# Patient Record
Sex: MALE | Race: BLACK OR AFRICAN AMERICAN | Employment: OTHER | URBAN - NONMETROPOLITAN AREA
[De-identification: names, ages, dates, MRNs, and addresses within clinical notes are randomized per-mention and may not be internally consistent; named-entity substitution may affect disease eponyms.]

---

## 2018-10-02 ENCOUNTER — HOSPITAL ENCOUNTER (EMERGENCY)
Age: 63
Discharge: HOME | End: 2018-10-02
Payer: MEDICAID

## 2018-10-02 VITALS
TEMPERATURE: 98.6 F | SYSTOLIC BLOOD PRESSURE: 133 MMHG | BODY MASS INDEX: 26.31 KG/M2 | OXYGEN SATURATION: 94 % | HEART RATE: 93 BPM | HEIGHT: 74 IN | DIASTOLIC BLOOD PRESSURE: 99 MMHG | RESPIRATION RATE: 18 BRPM | WEIGHT: 205 LBS

## 2018-10-02 DIAGNOSIS — R03.0 ELEVATED BLOOD PRESSURE READING: ICD-10-CM

## 2018-10-02 DIAGNOSIS — E86.0 DEHYDRATION: ICD-10-CM

## 2018-10-02 DIAGNOSIS — R04.0 ANTERIOR EPISTAXIS: Primary | ICD-10-CM

## 2018-10-02 PROCEDURE — 96360 HYDRATION IV INFUSION INIT: CPT

## 2018-10-02 PROCEDURE — 2500000003 HC RX 250 WO HCPCS: Performed by: PHYSICIAN ASSISTANT

## 2018-10-02 PROCEDURE — 99283 EMERGENCY DEPT VISIT LOW MDM: CPT

## 2018-10-02 PROCEDURE — 2580000003 HC RX 258: Performed by: PHYSICIAN ASSISTANT

## 2018-10-02 RX ORDER — 0.9 % SODIUM CHLORIDE 0.9 %
1000 INTRAVENOUS SOLUTION INTRAVENOUS ONCE
Status: COMPLETED | OUTPATIENT
Start: 2018-10-02 | End: 2018-10-02

## 2018-10-02 RX ADMIN — SODIUM CHLORIDE 1000 ML: 9 INJECTION, SOLUTION INTRAVENOUS at 14:20

## 2018-10-02 RX ADMIN — PHENYLEPHRINE HYDROCHLORIDE 1 SPRAY: 0.5 SPRAY NASAL at 14:02

## 2018-10-02 ASSESSMENT — ENCOUNTER SYMPTOMS
COUGH: 0
RHINORRHEA: 0
BACK PAIN: 0
WHEEZING: 0
ABDOMINAL PAIN: 0
NAUSEA: 0
SORE THROAT: 0
EYE DISCHARGE: 0
SHORTNESS OF BREATH: 0
VOMITING: 0
EYE REDNESS: 0
DIARRHEA: 0

## 2018-10-02 NOTE — ED TRIAGE NOTES
Arrives to  ER for the evaluation of left nares nose bleed that started approx 1 hour ago. Has been having nose bleed the past 3 days which he has been able to stop. Today the bleeding would not stop. Denies any use of anticoagulant or ASA, plavix. Only takes diabetic medications. [place], [id number] in room to assess.

## 2020-03-16 ENCOUNTER — HOSPITAL ENCOUNTER (OUTPATIENT)
Age: 65
Setting detail: SPECIMEN
Discharge: HOME | End: 2020-03-16
Payer: MEDICAID

## 2020-03-16 LAB
ABSOLUTE EOS #: 0.13 K/UL (ref 0–0.44)
ABSOLUTE IMMATURE GRANULOCYTE: <0.03 K/UL (ref 0–0.3)
ABSOLUTE LYMPH #: 2.1 K/UL (ref 1.1–3.7)
ABSOLUTE MONO #: 0.56 K/UL (ref 0.1–1.2)
ALBUMIN SERPL-MCNC: 4.4 G/DL (ref 3.5–5.2)
ALBUMIN/GLOBULIN RATIO: 1.3 (ref 1–2.5)
ALP BLD-CCNC: 69 U/L (ref 40–129)
ALT SERPL-CCNC: 21 U/L (ref 5–41)
ANION GAP SERPL CALCULATED.3IONS-SCNC: 13 MMOL/L (ref 9–17)
AST SERPL-CCNC: 15 U/L
BASOPHILS # BLD: 1 % (ref 0–2)
BASOPHILS ABSOLUTE: 0.06 K/UL (ref 0–0.2)
BILIRUB SERPL-MCNC: 0.94 MG/DL (ref 0.3–1.2)
BUN BLDV-MCNC: 12 MG/DL (ref 8–23)
BUN/CREAT BLD: NORMAL (ref 9–20)
CALCIUM SERPL-MCNC: 9.7 MG/DL (ref 8.6–10.4)
CHLORIDE BLD-SCNC: 101 MMOL/L (ref 98–107)
CHOLESTEROL/HDL RATIO: 4.2
CHOLESTEROL: 162 MG/DL
CO2: 24 MMOL/L (ref 20–31)
CREAT SERPL-MCNC: 0.86 MG/DL (ref 0.7–1.2)
DIFFERENTIAL TYPE: ABNORMAL
EOSINOPHILS RELATIVE PERCENT: 2 % (ref 1–4)
GFR AFRICAN AMERICAN: >60 ML/MIN
GFR NON-AFRICAN AMERICAN: >60 ML/MIN
GFR SERPL CREATININE-BSD FRML MDRD: NORMAL ML/MIN/{1.73_M2}
GFR SERPL CREATININE-BSD FRML MDRD: NORMAL ML/MIN/{1.73_M2}
GLUCOSE BLD-MCNC: 91 MG/DL (ref 70–99)
HCT VFR BLD CALC: 51.3 % (ref 40.7–50.3)
HDLC SERPL-MCNC: 39 MG/DL
HEMOGLOBIN: 16.6 G/DL (ref 13–17)
IMMATURE GRANULOCYTES: 0 %
LDL CHOLESTEROL: 97 MG/DL (ref 0–130)
LYMPHOCYTES # BLD: 27 % (ref 24–43)
MCH RBC QN AUTO: 29.8 PG (ref 25.2–33.5)
MCHC RBC AUTO-ENTMCNC: 32.4 G/DL (ref 28.4–34.8)
MCV RBC AUTO: 92.1 FL (ref 82.6–102.9)
MONOCYTES # BLD: 7 % (ref 3–12)
NRBC AUTOMATED: 0 PER 100 WBC
PDW BLD-RTO: 12.2 % (ref 11.8–14.4)
PLATELET # BLD: 240 K/UL (ref 138–453)
PLATELET ESTIMATE: ABNORMAL
PMV BLD AUTO: 11.8 FL (ref 8.1–13.5)
POTASSIUM SERPL-SCNC: 3.9 MMOL/L (ref 3.7–5.3)
RBC # BLD: 5.57 M/UL (ref 4.21–5.77)
RBC # BLD: ABNORMAL 10*6/UL
SEG NEUTROPHILS: 63 % (ref 36–65)
SEGMENTED NEUTROPHILS ABSOLUTE COUNT: 4.92 K/UL (ref 1.5–8.1)
SODIUM BLD-SCNC: 138 MMOL/L (ref 135–144)
TOTAL PROTEIN: 7.9 G/DL (ref 6.4–8.3)
TRIGL SERPL-MCNC: 132 MG/DL
TSH SERPL DL<=0.05 MIU/L-ACNC: 1 MIU/L (ref 0.3–5)
VLDLC SERPL CALC-MCNC: ABNORMAL MG/DL (ref 1–30)
WBC # BLD: 7.8 K/UL (ref 3.5–11.3)
WBC # BLD: ABNORMAL 10*3/UL

## 2020-05-01 ENCOUNTER — APPOINTMENT (OUTPATIENT)
Dept: CT IMAGING | Age: 65
End: 2020-05-01
Payer: MEDICAID

## 2020-05-01 ENCOUNTER — HOSPITAL ENCOUNTER (OUTPATIENT)
Age: 65
Setting detail: OBSERVATION
Discharge: HOME | End: 2020-05-03
Attending: INTERNAL MEDICINE | Admitting: INTERNAL MEDICINE
Payer: MEDICAID

## 2020-05-01 LAB
ANION GAP SERPL CALCULATED.3IONS-SCNC: 16 MEQ/L (ref 8–16)
BASOPHILS # BLD: 0.7 %
BASOPHILS ABSOLUTE: 0.1 THOU/MM3 (ref 0–0.1)
BUN BLDV-MCNC: 10 MG/DL (ref 7–22)
CALCIUM SERPL-MCNC: 9.1 MG/DL (ref 8.5–10.5)
CHLORIDE BLD-SCNC: 102 MEQ/L (ref 98–111)
CO2: 21 MEQ/L (ref 23–33)
CREAT SERPL-MCNC: 1 MG/DL (ref 0.4–1.2)
EKG ATRIAL RATE: 112 BPM
EKG P AXIS: 44 DEGREES
EKG P-R INTERVAL: 168 MS
EKG Q-T INTERVAL: 356 MS
EKG QRS DURATION: 122 MS
EKG QTC CALCULATION (BAZETT): 485 MS
EKG R AXIS: -17 DEGREES
EKG T AXIS: 18 DEGREES
EKG VENTRICULAR RATE: 112 BPM
EOSINOPHIL # BLD: 1.2 %
EOSINOPHILS ABSOLUTE: 0.1 THOU/MM3 (ref 0–0.4)
ERYTHROCYTE [DISTWIDTH] IN BLOOD BY AUTOMATED COUNT: 12.9 % (ref 11.5–14.5)
ERYTHROCYTE [DISTWIDTH] IN BLOOD BY AUTOMATED COUNT: 45.1 FL (ref 35–45)
ETHYL ALCOHOL, SERUM: 0.11 %
GFR SERPL CREATININE-BSD FRML MDRD: > 90 ML/MIN/1.73M2
GLUCOSE BLD-MCNC: 248 MG/DL (ref 70–108)
HCT VFR BLD CALC: 51.7 % (ref 42–52)
HEMOGLOBIN: 16.5 GM/DL (ref 14–18)
IMMATURE GRANS (ABS): 0.09 THOU/MM3 (ref 0–0.07)
IMMATURE GRANULOCYTES: 1.1 %
LYMPHOCYTES # BLD: 34.2 %
LYMPHOCYTES ABSOLUTE: 2.9 THOU/MM3 (ref 1–4.8)
MAGNESIUM: 2.2 MG/DL (ref 1.6–2.4)
MCH RBC QN AUTO: 30.3 PG (ref 26–33)
MCHC RBC AUTO-ENTMCNC: 31.9 GM/DL (ref 32.2–35.5)
MCV RBC AUTO: 95 FL (ref 80–94)
MONOCYTES # BLD: 8.6 %
MONOCYTES ABSOLUTE: 0.7 THOU/MM3 (ref 0.4–1.3)
NUCLEATED RED BLOOD CELLS: 0 /100 WBC
OSMOLALITY CALCULATION: 284.9 MOSMOL/KG (ref 275–300)
PLATELET # BLD: 231 THOU/MM3 (ref 130–400)
PMV BLD AUTO: 11.9 FL (ref 9.4–12.4)
POTASSIUM SERPL-SCNC: 3.9 MEQ/L (ref 3.5–5.2)
RBC # BLD: 5.44 MILL/MM3 (ref 4.7–6.1)
SEG NEUTROPHILS: 54.2 %
SEGMENTED NEUTROPHILS ABSOLUTE COUNT: 4.6 THOU/MM3 (ref 1.8–7.7)
SODIUM BLD-SCNC: 139 MEQ/L (ref 135–145)
TROPONIN T: < 0.01 NG/ML
TSH SERPL DL<=0.05 MIU/L-ACNC: 6.9 UIU/ML (ref 0.4–4.2)
WBC # BLD: 8.4 THOU/MM3 (ref 4.8–10.8)

## 2020-05-01 PROCEDURE — 70450 CT HEAD/BRAIN W/O DYE: CPT

## 2020-05-01 PROCEDURE — 36415 COLL VENOUS BLD VENIPUNCTURE: CPT

## 2020-05-01 PROCEDURE — 84484 ASSAY OF TROPONIN QUANT: CPT

## 2020-05-01 PROCEDURE — 80048 BASIC METABOLIC PNL TOTAL CA: CPT

## 2020-05-01 PROCEDURE — 85025 COMPLETE CBC W/AUTO DIFF WBC: CPT

## 2020-05-01 PROCEDURE — 84443 ASSAY THYROID STIM HORMONE: CPT

## 2020-05-01 PROCEDURE — 96374 THER/PROPH/DIAG INJ IV PUSH: CPT

## 2020-05-01 PROCEDURE — G0480 DRUG TEST DEF 1-7 CLASSES: HCPCS

## 2020-05-01 PROCEDURE — 99285 EMERGENCY DEPT VISIT HI MDM: CPT

## 2020-05-01 PROCEDURE — 93005 ELECTROCARDIOGRAM TRACING: CPT | Performed by: NURSE PRACTITIONER

## 2020-05-01 PROCEDURE — 83735 ASSAY OF MAGNESIUM: CPT

## 2020-05-01 ASSESSMENT — ENCOUNTER SYMPTOMS
SHORTNESS OF BREATH: 0
NAUSEA: 0
VOMITING: 0
ABDOMINAL PAIN: 0

## 2020-05-02 ENCOUNTER — APPOINTMENT (OUTPATIENT)
Dept: CT IMAGING | Age: 65
End: 2020-05-02
Payer: MEDICAID

## 2020-05-02 ENCOUNTER — APPOINTMENT (OUTPATIENT)
Dept: GENERAL RADIOLOGY | Age: 65
End: 2020-05-02
Payer: MEDICAID

## 2020-05-02 PROBLEM — R41.82 AMS (ALTERED MENTAL STATUS): Status: ACTIVE | Noted: 2020-05-02

## 2020-05-02 LAB
AMPHETAMINE+METHAMPHETAMINE URINE SCREEN: NEGATIVE
BACTERIA: ABNORMAL
BARBITURATE QUANTITATIVE URINE: NEGATIVE
BASE EXCESS (CALCULATED): 0.1 MMOL/L (ref -2.5–2.5)
BENZODIAZEPINE QUANTITATIVE URINE: NEGATIVE
BILIRUBIN URINE: NEGATIVE
BLOOD, URINE: NEGATIVE
CANNABINOID QUANTITATIVE URINE: NEGATIVE
CASTS: ABNORMAL /LPF
CASTS: ABNORMAL /LPF
CHARACTER, URINE: CLEAR
COCAINE METABOLITE QUANTITATIVE URINE: POSITIVE
COLLECTED BY:: ABNORMAL
COLOR: YELLOW
CRYSTALS: ABNORMAL
DEVICE: ABNORMAL
EPITHELIAL CELLS, UA: ABNORMAL /HPF
GLUCOSE BLD-MCNC: 121 MG/DL (ref 70–108)
GLUCOSE BLD-MCNC: 130 MG/DL (ref 70–108)
GLUCOSE BLD-MCNC: 155 MG/DL (ref 70–108)
GLUCOSE, URINE: 500 MG/DL
HCO3: 28 MMOL/L (ref 23–28)
IFIO2: 3
KETONES, URINE: ABNORMAL
LEUKOCYTE ESTERASE, URINE: NEGATIVE
MISCELLANEOUS LAB TEST RESULT: ABNORMAL
NITRITE, URINE: NEGATIVE
O2 SATURATION: 97 %
OPIATES, URINE: NEGATIVE
OXYCODONE: NEGATIVE
PCO2: 57 MMHG (ref 35–45)
PH BLOOD GAS: 7.3 (ref 7.35–7.45)
PH UA: 5 (ref 5–9)
PHENCYCLIDINE QUANTITATIVE URINE: NEGATIVE
PO2: 104 MMHG (ref 71–104)
PROTEIN UA: 300 MG/DL
RBC URINE: ABNORMAL /HPF
RENAL EPITHELIAL, UA: ABNORMAL
SARS-COV-2, NAAT: NOT DETECTED
SOURCE, BLOOD GAS: ABNORMAL
SPECIFIC GRAVITY UA: 1.02 (ref 1–1.03)
UROBILINOGEN, URINE: 1 EU/DL (ref 0–1)
WBC UA: ABNORMAL /HPF
YEAST: ABNORMAL

## 2020-05-02 PROCEDURE — 80307 DRUG TEST PRSMV CHEM ANLYZR: CPT

## 2020-05-02 PROCEDURE — 82948 REAGENT STRIP/BLOOD GLUCOSE: CPT

## 2020-05-02 PROCEDURE — 1200000003 HC TELEMETRY R&B

## 2020-05-02 PROCEDURE — 2709999900 HC NON-CHARGEABLE SUPPLY

## 2020-05-02 PROCEDURE — 2580000003 HC RX 258: Performed by: PHYSICIAN ASSISTANT

## 2020-05-02 PROCEDURE — G0378 HOSPITAL OBSERVATION PER HR: HCPCS

## 2020-05-02 PROCEDURE — 6360000002 HC RX W HCPCS

## 2020-05-02 PROCEDURE — 6370000000 HC RX 637 (ALT 250 FOR IP): Performed by: PHYSICIAN ASSISTANT

## 2020-05-02 PROCEDURE — 71275 CT ANGIOGRAPHY CHEST: CPT

## 2020-05-02 PROCEDURE — 94760 N-INVAS EAR/PLS OXIMETRY 1: CPT

## 2020-05-02 PROCEDURE — 71045 X-RAY EXAM CHEST 1 VIEW: CPT

## 2020-05-02 PROCEDURE — 99223 1ST HOSP IP/OBS HIGH 75: CPT | Performed by: PHYSICIAN ASSISTANT

## 2020-05-02 PROCEDURE — 93010 ELECTROCARDIOGRAM REPORT: CPT | Performed by: INTERNAL MEDICINE

## 2020-05-02 PROCEDURE — U0002 COVID-19 LAB TEST NON-CDC: HCPCS

## 2020-05-02 PROCEDURE — 82803 BLOOD GASES ANY COMBINATION: CPT

## 2020-05-02 PROCEDURE — 81001 URINALYSIS AUTO W/SCOPE: CPT

## 2020-05-02 PROCEDURE — 2700000000 HC OXYGEN THERAPY PER DAY

## 2020-05-02 PROCEDURE — 36600 WITHDRAWAL OF ARTERIAL BLOOD: CPT

## 2020-05-02 PROCEDURE — 6360000004 HC RX CONTRAST MEDICATION: Performed by: NURSE PRACTITIONER

## 2020-05-02 RX ORDER — ONDANSETRON 2 MG/ML
INJECTION INTRAMUSCULAR; INTRAVENOUS
Status: COMPLETED
Start: 2020-05-02 | End: 2020-05-02

## 2020-05-02 RX ORDER — ONDANSETRON 2 MG/ML
4 INJECTION INTRAMUSCULAR; INTRAVENOUS ONCE
Status: COMPLETED | OUTPATIENT
Start: 2020-05-02 | End: 2020-05-02

## 2020-05-02 RX ORDER — DEXTROSE MONOHYDRATE 50 MG/ML
100 INJECTION, SOLUTION INTRAVENOUS PRN
Status: DISCONTINUED | OUTPATIENT
Start: 2020-05-02 | End: 2020-05-03 | Stop reason: HOSPADM

## 2020-05-02 RX ORDER — ACETAMINOPHEN 650 MG/1
650 SUPPOSITORY RECTAL EVERY 6 HOURS PRN
Status: DISCONTINUED | OUTPATIENT
Start: 2020-05-02 | End: 2020-05-03 | Stop reason: HOSPADM

## 2020-05-02 RX ORDER — ACETAMINOPHEN 325 MG/1
650 TABLET ORAL EVERY 6 HOURS PRN
Status: DISCONTINUED | OUTPATIENT
Start: 2020-05-02 | End: 2020-05-03 | Stop reason: HOSPADM

## 2020-05-02 RX ORDER — NICOTINE POLACRILEX 4 MG
15 LOZENGE BUCCAL PRN
Status: DISCONTINUED | OUTPATIENT
Start: 2020-05-02 | End: 2020-05-03 | Stop reason: HOSPADM

## 2020-05-02 RX ORDER — DEXTROSE MONOHYDRATE 25 G/50ML
12.5 INJECTION, SOLUTION INTRAVENOUS PRN
Status: DISCONTINUED | OUTPATIENT
Start: 2020-05-02 | End: 2020-05-03 | Stop reason: HOSPADM

## 2020-05-02 RX ORDER — SODIUM CHLORIDE 0.9 % (FLUSH) 0.9 %
10 SYRINGE (ML) INJECTION EVERY 12 HOURS SCHEDULED
Status: DISCONTINUED | OUTPATIENT
Start: 2020-05-02 | End: 2020-05-03 | Stop reason: HOSPADM

## 2020-05-02 RX ORDER — SODIUM CHLORIDE 0.9 % (FLUSH) 0.9 %
10 SYRINGE (ML) INJECTION PRN
Status: DISCONTINUED | OUTPATIENT
Start: 2020-05-02 | End: 2020-05-03 | Stop reason: HOSPADM

## 2020-05-02 RX ORDER — POLYETHYLENE GLYCOL 3350 17 G/17G
17 POWDER, FOR SOLUTION ORAL DAILY PRN
Status: DISCONTINUED | OUTPATIENT
Start: 2020-05-02 | End: 2020-05-03 | Stop reason: HOSPADM

## 2020-05-02 RX ORDER — PROMETHAZINE HYDROCHLORIDE 25 MG/1
12.5 TABLET ORAL EVERY 6 HOURS PRN
Status: DISCONTINUED | OUTPATIENT
Start: 2020-05-02 | End: 2020-05-03 | Stop reason: HOSPADM

## 2020-05-02 RX ORDER — ONDANSETRON 2 MG/ML
4 INJECTION INTRAMUSCULAR; INTRAVENOUS EVERY 6 HOURS PRN
Status: DISCONTINUED | OUTPATIENT
Start: 2020-05-02 | End: 2020-05-03 | Stop reason: HOSPADM

## 2020-05-02 RX ADMIN — INSULIN LISPRO 2 UNITS: 100 INJECTION, SOLUTION INTRAVENOUS; SUBCUTANEOUS at 14:02

## 2020-05-02 RX ADMIN — Medication 10 ML: at 19:50

## 2020-05-02 RX ADMIN — ONDANSETRON 4 MG: 2 INJECTION INTRAMUSCULAR; INTRAVENOUS at 02:28

## 2020-05-02 RX ADMIN — IOPAMIDOL 80 ML: 755 INJECTION, SOLUTION INTRAVENOUS at 05:12

## 2020-05-02 NOTE — ED NOTES
Covid test completed on pt. Pt aware of POC to be admitted. Pt alert and oriented, but groggy. Pt able to wake up and answer nurse questions appropriately. VS updated.       Carleen Rudolph RN  05/02/20 0619

## 2020-05-02 NOTE — ED PROVIDER NOTES
[address]       Chief Complaint   Patient presents with    Altered Mental Status       Nurses Notesreviewed and I agree except as noted in the HPI. HISTORY OF PRESENT ILLNESS    Edilson Books is a 72 y.o. male who presents to the Emergency Department from home for the evaluation of altered mental status. The patient had loss of consciousness at home and family called 911. En route, the patients BP was high, SpO2 was 70% and he was not moving his arms or legs. Patient was given 4 mg of narcan via nasally ans is now awake, angry and moving his arms and legs. The patient denies taking any pain mediations or illicit drugs. Patient is also sleepy. No further complaints at the time of the initial encounter. HPI was provided by the patient. REVIEW OF SYSTEMS     Review of Systems   Constitutional: Negative for fever. Respiratory: Negative for shortness of breath. Cardiovascular: Negative for chest pain. Gastrointestinal: Negative for abdominal pain, nausea and vomiting. Musculoskeletal: Negative for arthralgias. PAST MEDICAL HISTORY    has a past medical history of Diabetes mellitus ([facility] [address] SURGICAL HISTORY      has a past surgical history that includes Mandible fracture surgery. CURRENT MEDICATIONS       Previous Medications    No medications on file       ALLERGIES     has No Known Allergies. FAMILY HISTORY     has no family status information on file. family history is not on file. SOCIAL HISTORY      reports that he has been smoking cigarettes. He has been smoking about 0.75 packs per day. He has never used smokeless tobacco. He reports current alcohol use of about 2.0 standard drinks of alcohol per week. He reports current drug use. Drug: Cocaine. PHYSICAL EXAM     INITIAL VITALS:  weight is 220 lb (99.8 kg). His oral temperature is 97.2 °F (36.2 °C). His blood pressure is 120/100 (abnormal) and his pulse is 96.  His respiration is 14 and oxygen saturation is 94%. Physical Exam  Vitals signs and nursing note reviewed. Constitutional:       Appearance: Normal appearance. He is well-developed. HENT:      Head: Normocephalic. Right Ear: External ear normal.      Left Ear: External ear normal.      Nose: Nose normal.      Mouth/Throat:      Pharynx: Uvula midline. Eyes:      Conjunctiva/sclera: Conjunctivae normal.   Neck:      Musculoskeletal: Normal range of motion and neck supple. Cardiovascular:      Rate and Rhythm: Normal rate and regular rhythm. Heart sounds: Normal heart sounds, S1 normal and S2 normal.   Pulmonary:      Effort: Pulmonary effort is normal. No respiratory distress. Breath sounds: Normal breath sounds. Chest:      Chest wall: No tenderness. Abdominal:      General: Bowel sounds are normal. There is no distension. Palpations: Abdomen is soft. Tenderness: There is no abdominal tenderness. Musculoskeletal: Normal range of motion. Skin:     General: Skin is warm and dry. Coloration: Skin is not pale. Findings: No erythema or rash. Neurological:      Mental Status: He is alert and oriented to person, place, and time. Psychiatric:         Behavior: Behavior normal.         Thought Content: Thought content normal.         Judgment: Judgment normal.         DIFFERENTIAL DIAGNOSIS:   Drug overdose, syncope, CVA, aortic dissection,    DIAGNOSTIC RESULTS     EKG: All EKG's are interpreted by the Emergency Department Physician who either signs or Co-signs this chart in the absence of a cardiologist.    EKG read and interpreted by myself  gives impression of sinus tachycardia with heart rate of 112; interval 168; ;QTc 485; axis 44, -17, 18.  No STEMI     RADIOLOGY: non-plain film images(s) such as CT, Ultrasound and MRI are read by the radiologist.  Plain radiographic imagesare visualized and preliminarily interpreted by the emergency physician unless otherwise stated GLOMERULAR FILTRATION RATE, ESTIMATED   OSMOLALITY   COVID-19       EMERGENCY DEPARTMENT COURSE:   Vitals:    Vitals:    05/02/20 0334 05/02/20 0415 05/02/20 0440 05/02/20 0603   BP: 112/81 (!) 129/90  (!) 120/100   Pulse: 102 94  96   Resp: 14 16 11 14   Temp:       TempSrc:       SpO2: 91% 95% 93% 94%   Weight:           MDM  Patient was seen and evaluated in the emergency department, patient appeared to be no acute distress upon my initial evaluation, vital signs were reviewed, sinus tachycardia was noted. Physical exam was completed, he was alert, able to answer questions, he denied use of illicit drugs, he denied drinking alcohol, labs were obtained, he was acutely intoxicated, urine shows positive for cocaine. He did require 3 L of oxygen to maintain saturation greater than 90%. We are unable to wean this. Chest x-ray was obtained, mild interstitial infiltrates in the mid lower lungs which may be acute or chronic. Possible the patient was smoking crack and this is caused difficulty with his breathing. CTA of the chest was performed to be thorough. My shift ended and the patient was signed out to [facility], please see her note for final impression and disposition plan. Medications   ondansetron (ZOFRAN) injection 4 mg (4 mg Intravenous Given 5/2/20 0228)   iopamidol (ISOVUE-370) 76 % injection 80 mL (80 mLs Intravenous Given 5/2/20 0512)       Patient was seen independently by myself. The patient's final impression and disposition and plan was determined by myself. CRITICAL CARE:   None    CONSULTS:  None    PROCEDURES:  None    FINAL IMPRESSION    No diagnosis found. DISPOSITION/PLAN   Please see the note of Renetta AGUIRRE  PATIENT REFERRED TO:  No follow-up provider specified.     DISCHARGEMEDICATIONS:  New Prescriptions    No medications on file       (Please note that portions of this note were completedwith a voice recognition program.  Efforts were made to edit the dictations but occasionally words are mis-transcribed.)    Scribe:  Joni Bernstein 12/23/16 10:39 PM EDT Scribing for and in the presence of Tacos Yeager CNP. Signed by: Karine Stover, 05/02/20 6:16 AM    Provider:  I personally performed the services described in the documentation, reviewedand edited the documentation which was dictated to the scribe in my presence, and it accurately records my words and actions.     Tacos Yeager CNP 05/02/20 6:16 AM    Jame Yeager, JOSE R - KOLTON          ii4b, JOSE R - CNP  05/02/20 6718

## 2020-05-02 NOTE — H&P
Hospitalist - History & Physical      Patient: Jamie Ruth    Unit/Bed:6K-04/004-A  YOB: 1955  MRN: [id number]   Acct: [de-identified]   PCP: No primary care provider on file. Date of Service: Pt seen/examined on 05/02/20  and Admitted to Inpatient with expected LOS greater than two midnights due to medical therapy. Chief Complaint:  AMS    Assessment and Plan:-  1. Drug/alcohol overdose: UDS positive for cocaine, AGA 0.11. Pt admits to drinking heavily prior to arrival. Monitor for signs of withdrawal. No desire to quit at this time. Continue IVF and supportive care. 2. LOC/AMS: d/t #1. No acute findings on CT Head. Pt now AOx3.    3. Acute hypercapnic respiratory failure: d/t #1. No acute findings on CTA Chest. Encourage IS. Pt weaned to 2L, continue to wean as tolerated. 4. Chronic productive cough: Pt reports cough > 1 year and is current smoker. CTA Chest reports bilateral atelectasis. Pt advised to follow up with PCP and may need OP pulmonary referral for PFTs. 5. Hypothyroidism: TSH 6.9. No known hx or home medications. Check T4.   6. Hyperglycemia: Check A1c. SSI, Accu-checks. History Of Present Illness:    72year old male smoker who presented to the ED via EMS for evaluation of AMS. Pt had loss of consciousness at home and family called 911. En route SpO2 70% and pt was not moving. He awoke after 4mg of Narcan. Currently pt is AOx3. Pt states he doesn't remember exactly what happened but that he drank to much and must have passed out. Pt denies any drug use and states that \"if his drug screen is positive it is because he was around people smoking cocaine. \" Pt states he has approx 4 drinks a couple days a week, denies daily alcohol use. He reports a chronic cough with mucous production for approx the past year. Denies fever/chills, SOB, CP. Pt requiring 3L NC to maintain SpO2 >92%. Pt admitted to hospitalist service for further evaluation.        Past Medical History: Diagnosis Date    Diabetes mellitus ([facility] [address]        Past Surgical History:        Procedure Laterality Date    MANDIBLE FRACTURE SURGERY         Home Medications:   No current facility-administered medications on file prior to encounter. No current outpatient medications on file prior to encounter. Allergies:    Patient has no known allergies. Social History:    reports that he has been smoking cigarettes. He has been smoking about 0.75 packs per day. He has never used smokeless tobacco. He reports current alcohol use of about 2.0 standard drinks of alcohol per week. He reports current drug use. Drug: Cocaine. Family History:   No family history on file. Diet:  Diet NPO Effective Now    Review of systems:   Pertinent positives as noted in the HPI. All other systems reviewed and negative. PHYSICAL EXAM:  /82   Pulse 94   Temp 97.3 °F (36.3 °C) (Oral)   Resp 16   Ht 6' 2\" (1.88 m)   Wt 220 lb 3.2 oz (99.9 kg)   SpO2 94%   BMI 28.27 kg/m²   General appearance: No apparent distress, appears stated age and cooperative. HEENT: Normal cephalic, atraumatic without obvious deformity. Pupils equal, round, and reactive to light. Extra ocular muscles intact. Conjunctivae/corneas clear. Neck: Supple, with full range of motion. No jugular venous distention. Trachea midline. Respiratory:  Normal respiratory effort. Clear to auscultation, bilaterally without Rales/Wheezes/Rhonchi. Diminished bilaterally. Cardiovascular: Regular rate and rhythm with normal S1/S2 without murmurs, rubs or gallops. Abdomen: Soft, non-tender, non-distended with normal bowel sounds. Musculoskeletal:  No clubbing, cyanosis or edema bilaterally. Skin: Skin color, texture, turgor normal.  No rashes or lesions. Neurologic:  Neurovascularly intact without any focal sensory/motor deficits.  Cranial nerves: II-XII intact, grossly non-focal.  Psychiatric: Alert and oriented, thought content appropriate, normal insight  Capillary Refill: Brisk,< 3 seconds   Peripheral Pulses: +2 palpable, equal bilaterally     Labs:   Recent Labs     05/01/20  2243   WBC 8.4   HGB 16.5   HCT 51.7        Recent Labs     05/01/20  2243      K 3.9      CO2 21*   BUN 10   CREATININE 1.0   CALCIUM 9.1     No results for input(s): AST, ALT, BILIDIR, BILITOT, ALKPHOS in the last 72 hours. No results for input(s): INR in the last 72 hours. No results for input(s): Glean Jewish in the last 72 hours. Urinalysis:    Lab Results   Component Value Date    NITRU NEGATIVE 05/02/2020    WBCUA 0-2 05/02/2020    BACTERIA NONE SEEN 05/02/2020    RBCUA 0-2 05/02/2020    BLOODU NEGATIVE 05/02/2020    SPECGRAV 1.018 05/02/2020       Radiology:   CTA Chest W WO Contrast   Final Result      No acute pulmonary embolism. No acute pneumonia. Probable mucus in the proximal right mainstem bronchus. See discussion above. Bilateral multilobar atelectasis. 4.2 x 4.1 cm ascending aortic aneurysm. No aortic dissection. Cardiomegaly. **This report has been created using voice recognition software. It may contain minor errors which are inherent in voice recognition technology. **      Final report electronically signed by Dr. Greyson Fine on 5/2/2020 6:43 AM      XR CHEST PORTABLE   Final Result      Mild interstitial infiltrates in the mid lower lungs which may be acute or chronic as discussed above. No consolidative pneumonia. Mild cardiomegaly. **This report has been created using voice recognition software. It may contain minor errors which are inherent in voice recognition technology. **      Final report electronically signed by Dr. Greyson Fine on 5/2/2020 3:36 AM      CT Head WO Contrast   Final Result      No acute ischemic infarct, hemorrhage, or mass effect. Mild to moderate deep white matter hypodensities, nonspecific in nature but probably representing small vessel chronic ischemic changes. **This report has been created using voice recognition software. It may contain minor errors which are inherent in voice recognition technology. **      Final report electronically signed by Dr. Greyson Fine on 5/2/2020 12:13 AM        Ct Head Wo Contrast    Result Date: 5/2/2020  PROCEDURE: CT HEAD WO CONTRAST CLINICAL INFORMATION: altered mental status. COMPARISON: None TECHNIQUE: Noncontrast 5 mm axial images were obtained through the brain. Sagittal and coronal reconstructions were obtained and reviewed. All CT scans at this facility use dose modulation, iterative reconstruction, and/or weight-based dosing when appropriate to reduce radiation dose to as low as reasonably achievable. FINDINGS: Brain: There is no acute ischemic infarct, hemorrhage, midline shift, mass, or mass effect. There are mild to moderate deep white matter hypodensities, nonspecific in nature but probably representing small vessel chronic ischemic changes. Ventricles/basal cisterns: The ventricles and cisterns are of appropriate size and configuration for the patient's age. No evidence of obstructive hydrocephalus. Skull base/calvarium/osseous structures: Skull base and calvarium are intact. There is an old depressed right lamina papyracea fracture deformity. Soft tissues: Unremarkable Intraorbital contents: Unremarkable Sinuses: There is minimal mucosal thickening in bilateral ethmoid air cells. Mastoids: Unremarkable; the mastoid air cells are clear. No acute ischemic infarct, hemorrhage, or mass effect. Mild to moderate deep white matter hypodensities, nonspecific in nature but probably representing small vessel chronic ischemic changes. **This report has been created using voice recognition software. It may contain minor errors which are inherent in voice recognition technology. ** Final report electronically signed by Dr. Greyson Fine on 5/2/2020 12:13 AM    Cta Chest W Wo Contrast    Result Date: 5/2/2020  PROCEDURE: CTA CHEST W WO CONTRAST CLINICAL INFORMATION: hypoxia. COMPARISON: Chest x-ray earlier today TECHNIQUE: 3 mm thick by 1.5 mm interval axial images were obtained through the chest after the administration of IV contrast.  A non-contrast localizer was obtained. Sagittal and coronal MIP 3D reconstructions were performed on the scanner. 80 mL Isovue-370 were administered intravenously. All CT scans at this facility use dose modulation, iterative reconstruction, and/or weight-based dosing when appropriate to reduce radiation dose to as low as reasonably achievable. FINDINGS: Lungs: There is no pneumonia or mass. There are mild to moderate paraseptal and centrilobular emphysematous changes. There is bilateral multilobar mild dependent atelectasis. There is mild nodularity along the posterior wall of the proximal right mainstem bronchus probably representing mucus. A small polyp or neoplasm cannot be excluded. Pleura: There is no pleural effusion. There is no pneumothorax. Heart: There is mild to moderate cardiomegaly. There is no pericardial effusion. Pulmonary vasculature: There is adequate opacification of the pulmonary arteries. There is no pulmonary embolism. [place] and mediastinum: There is no hilar or mediastinal mass or adenopathy. There are right hilar calcified lymph nodes. There is a small amount of fluid in the distal esophagus either due to GE reflux or esophageal dysmotility. Thoracic aorta/vascular: There is no thoracic aortic dissection. There is mild aneurysmal dilatation of the ascending thoracic aorta measuring 4.2 x 4.1 cm. No evidence of aneurysm leak or rupture. The imaged brachiocephalic arteries are unremarkable. Imaged upper abdomen: There is a solitary splenic calcified granuloma. Remainder the imaged upper abdomen is unremarkable. Musculoskeletal system: Unremarkable Chest/body wall soft tissues: Unremarkable Thyroid: Unremarkable     No acute pulmonary embolism. No acute pneumonia.  Probable mucus in the proximal right mainstem bronchus. See discussion above. Bilateral multilobar atelectasis. 4.2 x 4.1 cm ascending aortic aneurysm. No aortic dissection. Cardiomegaly. **This report has been created using voice recognition software. It may contain minor errors which are inherent in voice recognition technology. ** Final report electronically signed by Dr. Barb Shaikh on 5/2/2020 6:43 AM    Xr Chest Portable    Result Date: 5/2/2020  PROCEDURE: XR CHEST PORTABLE CLINICAL INFORMATION: SOB. COMPARISON: No prior study. TECHNIQUE: AP Portable chest xray FINDINGS: Lines/tubes/devices: none Lungs/pleura: There are mild interstitial infiltrates in the mid and lower lungs bilaterally which may represent mild atypical pneumonia, pulmonary edema, or pulmonary fibrosis. There is no consolidative pneumonia. No pleural effusion. No pneumothorax. Heart: There is mild cardiomegaly. Mediastinum/jerald: No obvious mass or adenopathy. There is mild tortuosity of the aorta. Skeleton: There is multilevel vertebral endplate spondylosis. Soft tissues: There are multiple metallic densities in the right neck soft tissues and over the right mandible. Mild interstitial infiltrates in the mid lower lungs which may be acute or chronic as discussed above. No consolidative pneumonia. Mild cardiomegaly. **This report has been created using voice recognition software. It may contain minor errors which are inherent in voice recognition technology. ** Final report electronically signed by Dr. Barb Shaikh on 5/2/2020 3:36 AM        EKG: sinus tachycardia    Electronically signed by Kishore Nair PA-C on 5/2/2020 at 8:26 AM

## 2020-05-02 NOTE — ED TRIAGE NOTES
Pt presents to the ED via [facility]. Report from EMS states that pts legs became weak and then he went unresponsive. Upon arrival EMS had pulse ox of 72% which came up to 90% around 10 minutes later. Pt was given 4mg Narcan en route. Pt became more alert for squad. Pt is snoring and groggy upon arrival. Pt was able to move all extremities and was able to answer to voice. Pt is confused at this time. Lulu Gonzalez CNP at bedside. Pt put on 3L NC and VS obtained. EKG completed. IV inserted and labs obtained.

## 2020-05-02 NOTE — ED PROVIDER NOTES
Patient was seen and evaluated in the emergency department, patient appeared to be in no acute distress. Patient was seen and evaluated previously by Nura Yeager CNP, from whom I took over this case and patient care. I have personally performed and/or participated in the history, exam and medical decision making and agree with all pertinent clinical information as noted by the previous provider. I have also reviewed and agree with the past medical, family and social history unless otherwise noted. I have personally performed a face-to-face diagnostic evaluation on this patient. I have reviewed the previous provider's orders for imaging and labs as well as the previous provider's documentation. The patient was seen and evaluated within the ED today with altered mental status. The patient's previous provider reports that the patient experienced an episode of loss of consciousness at home. His SPO2 was 70% when EMS arrived. It was reported the patient was not moving his arms or legs. The patient was given 4 mg Narcan in route to this department and was somnolent upon arrival.       I took over care of this patient pending chest CTA results. Within the department, I observed the patient's vital signs to be within acceptable range. He was initially mildly tachycardic on arrival to this department, which did resolve. O2 sat remained between 91%-97% on 3 L O2 NC. Patient required supplemental oxygen in order to maintain O2 sat above 90%. On exam, the patient's previous provided reported that the patient was stable but somnolent on arrival. He was later more alert and answered questions. Radiologic studies within the department revealed no acute hemorrhage, infarct, mass, or other acute cranial or intracranial pathology. CXR revealed mild interstitial infiltrates in the mid lower lungs which may be acute or chronic as discussed above. No consolidative pneumonia. Mild cardiomegaly.  CTA of the chest revealed no PE or aortic dissection. 4.2 x 4.1 cm ascending aortic aneurysm. Laboratory work revealed a BG of 248. CO2 was 21. WBC count was within normal range. ABG revealed a pH of 7.3, PCO2 57. COVID-19 testing was negative. UDS is positive for cocaine, EtOH was 0.11. Troponin is negative. TSH noted to be 6.9. EKG Emergency (Preliminary result)    Component (Lab Inquiry)   Collection Time Result Time Ventricular Rate Atrial Rate P-R Interval QRS Duration Q-T Interval   05/01/20 22:36:15 05/01/20 22:43:21 112 112 168 122 356       Collection Time Result Time QTc Calculation (Bazett) P Axis R Axis T Axis   05/01/20 22:36:15 05/01/20 22:43:21 485 44 -17 18         Preliminary result                Narrative:    Sinus tachycardia  Possible Left atrial enlargement  Inferior infarct , age undetermined  Abnormal ECG  No previous ECGs available              Within the department, the patient was treated with Zofran. I observed the patient's condition to remain stable during the duration of the stay. The patient continued requiring supplemental oxygen in order to maintain oxygen saturation above 90%. He will require admission for observation of this hypoxia. The patient is amenable with this plan. I explained my proposed course of treatment to the patient, who was amenable to my treatment and admission decisions. Leticia Tyson, Hospitalist TIMOTHY,  was consulted and kindly agreed to admit the patient for further evaluation and management. The patient remained stable and maintained stable vital signs until admission to the floor. Clinical Impression:  1. Hypoxia  2. Syncope and collapse  3. AMS  4. Cocaine use  5.  Acute alcohol intoxication    Richelle Paulino PA-C  5/2/20  [address]  05/02/20 [address]  05/02/20 3283

## 2020-05-02 NOTE — ED NOTES
Pt more alert at this time. Pt breathing easier and unlabored. Pt VS obtained.       Catracho Doe RN  05/02/20 4019

## 2020-05-02 NOTE — ED NOTES
Attempted to ambulate pt. Pt sat on side of bed and got dizzy and began having emesis. Pt returned to cot and reported feeling better.      Leslie Tony RN  05/02/20 4237

## 2020-05-02 NOTE — ED NOTES
ED to inpatient nurses report    Chief Complaint   Patient presents with    Altered Mental Status      Present to ED from home  LOC: alert to only name  Vital signs   Vitals:    05/02/20 0440 05/02/20 0603 05/02/20 0627 05/02/20 0706   BP:  (!) 120/100 132/84 119/89   Pulse:  96 97 95   Resp: 11 14 12 14   Temp:       TempSrc:       SpO2: 93% 94% 94% 96%   Weight:          Oxygen Baseline 96% on 3L NC    Current needs required 3L NC Bipap/Cpap No  LDAs:   Peripheral IV 05/01/20 Right Forearm (Active)   Site Assessment Clean;Dry; Intact 5/2/2020  6:05 AM   Line Status Normal saline locked 5/2/2020  6:05 AM   Dressing Status Clean;Dry; Intact 5/2/2020  6:05 AM   Dressing Intervention New 5/2/2020  6:05 AM     Mobility: Requires assistance * 2  Pending ED orders: none  Present condition: stable    Electronically signed by Milana Valente RN on 5/2/2020 at 7:39 AM       Milana Valente RN  05/02/20 [facility]

## 2020-05-02 NOTE — ED NOTES
Pt breathing easy and unlabored. Pt resting with eyes closed and snoring. Pt wakes to voice. Pt alert and oriented. VS updated.       Arnaldo Valdivia RN  05/02/20 8366

## 2020-05-02 NOTE — ED NOTES
Pt transported to [facility] by cart in stable condition. Called [facility] and informed Bhavna Morataya that the patient was on their way to the unit.         Prashant President, BERNADETTE  26/13/53 [id number]

## 2020-05-03 VITALS
BODY MASS INDEX: 29.3 KG/M2 | WEIGHT: 228.3 LBS | OXYGEN SATURATION: 96 % | DIASTOLIC BLOOD PRESSURE: 95 MMHG | SYSTOLIC BLOOD PRESSURE: 147 MMHG | RESPIRATION RATE: 17 BRPM | HEART RATE: 90 BPM | HEIGHT: 74 IN | TEMPERATURE: 97.4 F

## 2020-05-03 PROBLEM — Z91.89 HX OF DRUG OVERDOSE: Status: ACTIVE | Noted: 2020-05-03

## 2020-05-03 LAB
ALBUMIN SERPL-MCNC: 4.2 G/DL (ref 3.5–5.1)
ALP BLD-CCNC: 67 U/L (ref 38–126)
ALT SERPL-CCNC: 25 U/L (ref 11–66)
ANION GAP SERPL CALCULATED.3IONS-SCNC: 10 MEQ/L (ref 8–16)
AST SERPL-CCNC: 20 U/L (ref 5–40)
AVERAGE GLUCOSE: 150 MG/DL (ref 70–126)
BILIRUB SERPL-MCNC: 1.3 MG/DL (ref 0.3–1.2)
BILIRUBIN DIRECT: < 0.2 MG/DL (ref 0–0.3)
BUN BLDV-MCNC: 11 MG/DL (ref 7–22)
CALCIUM SERPL-MCNC: 9.2 MG/DL (ref 8.5–10.5)
CHLORIDE BLD-SCNC: 98 MEQ/L (ref 98–111)
CO2: 28 MEQ/L (ref 23–33)
CREAT SERPL-MCNC: 0.9 MG/DL (ref 0.4–1.2)
ERYTHROCYTE [DISTWIDTH] IN BLOOD BY AUTOMATED COUNT: 13 % (ref 11.5–14.5)
ERYTHROCYTE [DISTWIDTH] IN BLOOD BY AUTOMATED COUNT: 46.3 FL (ref 35–45)
GFR SERPL CREATININE-BSD FRML MDRD: > 90 ML/MIN/1.73M2
GLUCOSE BLD-MCNC: 114 MG/DL (ref 70–108)
GLUCOSE BLD-MCNC: 123 MG/DL (ref 70–108)
GLUCOSE BLD-MCNC: 182 MG/DL (ref 70–108)
HBA1C MFR BLD: 7 % (ref 4.4–6.4)
HCT VFR BLD CALC: 49.5 % (ref 42–52)
HEMOGLOBIN: 15.7 GM/DL (ref 14–18)
LACTIC ACID: 1 MMOL/L (ref 0.5–2.2)
MCH RBC QN AUTO: 30.4 PG (ref 26–33)
MCHC RBC AUTO-ENTMCNC: 31.7 GM/DL (ref 32.2–35.5)
MCV RBC AUTO: 95.9 FL (ref 80–94)
PLATELET # BLD: 212 THOU/MM3 (ref 130–400)
PMV BLD AUTO: 11.7 FL (ref 9.4–12.4)
POTASSIUM REFLEX MAGNESIUM: 4 MEQ/L (ref 3.5–5.2)
RBC # BLD: 5.16 MILL/MM3 (ref 4.7–6.1)
SODIUM BLD-SCNC: 136 MEQ/L (ref 135–145)
T4 FREE: 1.18 NG/DL (ref 0.93–1.76)
TOTAL PROTEIN: 6.9 G/DL (ref 6.1–8)
WBC # BLD: 9.1 THOU/MM3 (ref 4.8–10.8)

## 2020-05-03 PROCEDURE — 2580000003 HC RX 258: Performed by: PHYSICIAN ASSISTANT

## 2020-05-03 PROCEDURE — 82948 REAGENT STRIP/BLOOD GLUCOSE: CPT

## 2020-05-03 PROCEDURE — 80048 BASIC METABOLIC PNL TOTAL CA: CPT

## 2020-05-03 PROCEDURE — 85027 COMPLETE CBC AUTOMATED: CPT

## 2020-05-03 PROCEDURE — 94760 N-INVAS EAR/PLS OXIMETRY 1: CPT

## 2020-05-03 PROCEDURE — 80076 HEPATIC FUNCTION PANEL: CPT

## 2020-05-03 PROCEDURE — G0378 HOSPITAL OBSERVATION PER HR: HCPCS

## 2020-05-03 PROCEDURE — 94669 MECHANICAL CHEST WALL OSCILL: CPT

## 2020-05-03 PROCEDURE — 84439 ASSAY OF FREE THYROXINE: CPT

## 2020-05-03 PROCEDURE — 2700000000 HC OXYGEN THERAPY PER DAY

## 2020-05-03 PROCEDURE — 99217 PR OBSERVATION CARE DISCHARGE MANAGEMENT: CPT | Performed by: INTERNAL MEDICINE

## 2020-05-03 PROCEDURE — 36415 COLL VENOUS BLD VENIPUNCTURE: CPT

## 2020-05-03 PROCEDURE — 83605 ASSAY OF LACTIC ACID: CPT

## 2020-05-03 PROCEDURE — 83036 HEMOGLOBIN GLYCOSYLATED A1C: CPT

## 2020-05-03 RX ORDER — GUAIFENESIN/DEXTROMETHORPHAN 100-10MG/5
5 SYRUP ORAL EVERY 4 HOURS PRN
Status: DISCONTINUED | OUTPATIENT
Start: 2020-05-03 | End: 2020-05-03 | Stop reason: HOSPADM

## 2020-05-03 RX ORDER — BLOOD-GLUCOSE METER
1 KIT MISCELLANEOUS DAILY
Qty: 1 KIT | Refills: 0 | Status: SHIPPED | OUTPATIENT
Start: 2020-05-03

## 2020-05-03 RX ORDER — GLUCOSAMINE HCL/CHONDROITIN SU 500-400 MG
CAPSULE ORAL
Qty: 100 STRIP | Refills: 0 | Status: SHIPPED | OUTPATIENT
Start: 2020-05-03

## 2020-05-03 RX ORDER — UBIQUINOL 100 MG
CAPSULE ORAL
Qty: 100 EACH | Refills: 11 | Status: SHIPPED | OUTPATIENT
Start: 2020-05-03

## 2020-05-03 RX ORDER — GLIMEPIRIDE 1 MG/1
2 TABLET ORAL
Qty: 30 TABLET | Refills: 1 | Status: SHIPPED | OUTPATIENT
Start: 2020-05-03

## 2020-05-03 RX ORDER — LANCETS 30 GAUGE
1 EACH MISCELLANEOUS 2 TIMES DAILY
Qty: 100 EACH | Refills: 5 | Status: SHIPPED | OUTPATIENT
Start: 2020-05-03

## 2020-05-03 RX ADMIN — Medication 10 ML: at 07:45

## 2020-05-03 NOTE — PROGRESS NOTES
Discharge teaching and instructions for diagnosis/procedure of drug/alcohol overdose, acute hypercapnic respiratory failure completed with patient using teachback method. AVS reviewed. Printed prescriptions given to patient. Patient voiced understanding regarding prescriptions, follow up appointments, and care of self at home. Discharged ambulatory to  independent living per cab.

## 2020-05-03 NOTE — DISCHARGE SUMMARY
Hospitalist -discharge    Patient: Christina Stephen    Unit/Bed:6K-04/004-A  YOB: 1955  MRN: [id number]   Acct: [de-identified]   PCP: No primary care provider on file. Date of Service: Pt seen/examined on 05/03/20  and Admitted to Inpatient with expected LOS greater than two midnights due to medical therapy. However will be changing to observation prior to discharge    Chief Complaint:  AMS    Assessment and Plan:-  1. Drug/alcohol overdose: UDS positive for cocaine, AGA 0.11. Pt admits to drinking heavily prior to arrival. Monitor for signs of withdrawal. No desire to quit at this time. Continue IVF and supportive care. 5/3-back to baseline- ambulating on the floor, alert awake oriented x3-counseled to stop using drugs and avoid drink alcohol-patient understands  2. Acute metabolic encephalopathy-secondary to above-resolved  3. Acute hypercapnic respiratory failure: d/t #1. No acute findings on CTA Chest. Encourage IS. Patient is on room air  4. Chronic productive cough: Pt reports cough > 1 year and is current smoker. CTA Chest reports bilateral atelectasis. Pt advised to follow up with PCP and may need OP pulmonary referral for PFTs. COVID - 19 -5/2/20--> NEGATIVE   5. Hypothyroidism: TSH 6.9. No known hx or home medications. PCP follow-up  6. History of type 2 diabetes-on metformin and Amaryl at home-hemoglobin A1c is 7.0-we will follow-up with PCP in 1 week-mostly diabetic clinic follow-up as outpatient  Change to observation  Stable for home  Currently not in any drug or alcohol withdrawals  Counseled to quit drinking as well as using drugs  PCP in 1 week  Discharge time 20 minutes      History Of Present Illness:    72year old male smoker who presented to the ED via EMS for evaluation of AMS. Pt had loss of consciousness at home and family called 911. En route SpO2 70% and pt was not moving. He awoke after 4mg of Narcan. Currently pt is AOx3.  Pt states he doesn't remember exactly what happened but that he drank to much and must have passed out. Pt denies any drug use and states that \"if his drug screen is positive it is because he was around people smoking cocaine. \" Pt states he has approx 4 drinks a couple days a week, denies daily alcohol use. He reports a chronic cough with mucous production for approx the past year. Denies fever/chills, SOB, CP. Pt requiring 3L NC to maintain SpO2 >92%. Pt admitted to hospitalist service for further evaluation. Medication List      START taking these medications    Alcohol Prep 70 % Pads  3 times daily     blood glucose test strips  Test 3 times a day & as needed for symptoms of irregular blood glucose. glimepiride 1 MG tablet  Commonly known as:  AMARYL  Take 2 tablets by mouth every morning (before breakfast)     glucose monitoring kit monitoring kit  1 kit by Does not apply route daily     Lancets Misc  1 each by Does not apply route 2 times daily           Where to Get Your Medications      You can get these medications from any pharmacy    Bring a paper prescription for each of these medications  · Alcohol Prep 70 % Pads  · blood glucose test strips  · glimepiride 1 MG tablet  · glucose monitoring kit monitoring kit  · Lancets Misc     Review of systems:   Pertinent positives as noted in the HPI. All other systems reviewed and negative. PHYSICAL EXAM:  BP (!) 147/95   Pulse 90   Temp 97.4 °F (36.3 °C) (Oral)   Resp 17   Ht 6' 2\" (1.88 m)   Wt 228 lb 4.8 oz (103.6 kg)   SpO2 96%   BMI 29.31 kg/m²   General appearance: No apparent distress, appears stated age and cooperative. HEENT: Normal cephalic, atraumatic without obvious deformity. Pupils equal, round, and reactive to light. Extra ocular muscles intact. Conjunctivae/corneas clear. Neck: Supple, with full range of motion. No jugular venous distention. Trachea midline. Respiratory:  Normal respiratory effort.  Clear to auscultation, bilaterally without Rales/Wheezes/Rhonchi. Diminished bilaterally. Cardiovascular: Regular rate and rhythm with normal S1/S2 without murmurs, rubs or gallops. Abdomen: Soft, non-tender, non-distended with normal bowel sounds. Musculoskeletal:  No clubbing, cyanosis or edema bilaterally. Skin: Skin color, texture, turgor normal.  No rashes or lesions. Neurologic:  Neurovascularly intact without any focal sensory/motor deficits. Cranial nerves: II-XII intact, grossly non-focal.  Psychiatric: Alert and oriented, thought content appropriate, normal insight  Capillary Refill: Brisk,< 3 seconds   Peripheral Pulses: +2 palpable, equal bilaterally     Labs:   Recent Labs     05/01/20 2243 05/03/20  0535   WBC 8.4 9.1   HGB 16.5 15.7   HCT 51.7 49.5    212     Recent Labs     05/01/20 2243 05/03/20  0535    136   K 3.9 4.0    98   CO2 21* 28   BUN 10 11   CREATININE 1.0 0.9   CALCIUM 9.1 9.2     Recent Labs     05/03/20  0535   AST 20   ALT 25   BILIDIR <0.2   BILITOT 1.3*   ALKPHOS 67     No results for input(s): INR in the last 72 hours. No results for input(s): Anna Tamanna in the last 72 hours. Urinalysis:    Lab Results   Component Value Date    NITRU NEGATIVE 05/02/2020    WBCUA 0-2 05/02/2020    BACTERIA NONE SEEN 05/02/2020    RBCUA 0-2 05/02/2020    BLOODU NEGATIVE 05/02/2020    SPECGRAV 1.018 05/02/2020       Radiology:   CTA Chest W WO Contrast   Final Result      No acute pulmonary embolism. No acute pneumonia. Probable mucus in the proximal right mainstem bronchus. See discussion above. Bilateral multilobar atelectasis. 4.2 x 4.1 cm ascending aortic aneurysm. No aortic dissection. Cardiomegaly. **This report has been created using voice recognition software. It may contain minor errors which are inherent in voice recognition technology. **      Final report electronically signed by Dr. Nora Chilel on 5/2/2020 6:43 AM      XR CHEST PORTABLE   Final Result      Mild interstitial infiltrates in the mid lower lungs which may be acute or chronic as discussed above. No consolidative pneumonia. Mild cardiomegaly. **This report has been created using voice recognition software. It may contain minor errors which are inherent in voice recognition technology. **      Final report electronically signed by Dr. Regina Le on 5/2/2020 3:36 AM      CT Head WO Contrast   Final Result      No acute ischemic infarct, hemorrhage, or mass effect. Mild to moderate deep white matter hypodensities, nonspecific in nature but probably representing small vessel chronic ischemic changes. **This report has been created using voice recognition software. It may contain minor errors which are inherent in voice recognition technology. **      Final report electronically signed by Dr. Regina Le on 5/2/2020 12:13 AM        Ct Head Wo Contrast    Result Date: 5/2/2020  PROCEDURE: CT HEAD WO CONTRAST CLINICAL INFORMATION: altered mental status. COMPARISON: None TECHNIQUE: Noncontrast 5 mm axial images were obtained through the brain. Sagittal and coronal reconstructions were obtained and reviewed. All CT scans at this facility use dose modulation, iterative reconstruction, and/or weight-based dosing when appropriate to reduce radiation dose to as low as reasonably achievable. FINDINGS: Brain: There is no acute ischemic infarct, hemorrhage, midline shift, mass, or mass effect. There are mild to moderate deep white matter hypodensities, nonspecific in nature but probably representing small vessel chronic ischemic changes. Ventricles/basal cisterns: The ventricles and cisterns are of appropriate size and configuration for the patient's age. No evidence of obstructive hydrocephalus. Skull base/calvarium/osseous structures: Skull base and calvarium are intact. There is an old depressed right lamina papyracea fracture deformity.  Soft tissues: Unremarkable Intraorbital contents: Unremarkable Sinuses: There is minimal mucosal thickening in bilateral ethmoid air cells. Mastoids: Unremarkable; the mastoid air cells are clear. No acute ischemic infarct, hemorrhage, or mass effect. Mild to moderate deep white matter hypodensities, nonspecific in nature but probably representing small vessel chronic ischemic changes. **This report has been created using voice recognition software. It may contain minor errors which are inherent in voice recognition technology. ** Final report electronically signed by Dr. Elizabeth Rodrigues on 5/2/2020 12:13 AM    Cta Chest W Wo Contrast    Result Date: 5/2/2020  PROCEDURE: CTA CHEST W WO CONTRAST CLINICAL INFORMATION: hypoxia. COMPARISON: Chest x-ray earlier today TECHNIQUE: 3 mm thick by 1.5 mm interval axial images were obtained through the chest after the administration of IV contrast.  A non-contrast localizer was obtained. Sagittal and coronal MIP 3D reconstructions were performed on the scanner. 80 mL Isovue-370 were administered intravenously. All CT scans at this facility use dose modulation, iterative reconstruction, and/or weight-based dosing when appropriate to reduce radiation dose to as low as reasonably achievable. FINDINGS: Lungs: There is no pneumonia or mass. There are mild to moderate paraseptal and centrilobular emphysematous changes. There is bilateral multilobar mild dependent atelectasis. There is mild nodularity along the posterior wall of the proximal right mainstem bronchus probably representing mucus. A small polyp or neoplasm cannot be excluded. Pleura: There is no pleural effusion. There is no pneumothorax. Heart: There is mild to moderate cardiomegaly. There is no pericardial effusion. Pulmonary vasculature: There is adequate opacification of the pulmonary arteries. There is no pulmonary embolism. [place] and mediastinum: There is no hilar or mediastinal mass or adenopathy. There are right hilar calcified lymph nodes.  There is a small amount of fluid in the distal esophagus either due to GE reflux or esophageal dysmotility. Thoracic aorta/vascular: There is no thoracic aortic dissection. There is mild aneurysmal dilatation of the ascending thoracic aorta measuring 4.2 x 4.1 cm. No evidence of aneurysm leak or rupture. The imaged brachiocephalic arteries are unremarkable. Imaged upper abdomen: There is a solitary splenic calcified granuloma. Remainder the imaged upper abdomen is unremarkable. Musculoskeletal system: Unremarkable Chest/body wall soft tissues: Unremarkable Thyroid: Unremarkable     No acute pulmonary embolism. No acute pneumonia. Probable mucus in the proximal right mainstem bronchus. See discussion above. Bilateral multilobar atelectasis. 4.2 x 4.1 cm ascending aortic aneurysm. No aortic dissection. Cardiomegaly. **This report has been created using voice recognition software. It may contain minor errors which are inherent in voice recognition technology. ** Final report electronically signed by Dr. Chace Burch on 5/2/2020 6:43 AM    Xr Chest Portable    Result Date: 5/2/2020  PROCEDURE: XR CHEST PORTABLE CLINICAL INFORMATION: SOB. COMPARISON: No prior study. TECHNIQUE: AP Portable chest xray FINDINGS: Lines/tubes/devices: none Lungs/pleura: There are mild interstitial infiltrates in the mid and lower lungs bilaterally which may represent mild atypical pneumonia, pulmonary edema, or pulmonary fibrosis. There is no consolidative pneumonia. No pleural effusion. No pneumothorax. Heart: There is mild cardiomegaly. Mediastinum/jerald: No obvious mass or adenopathy. There is mild tortuosity of the aorta. Skeleton: There is multilevel vertebral endplate spondylosis. Soft tissues: There are multiple metallic densities in the right neck soft tissues and over the right mandible. Mild interstitial infiltrates in the mid lower lungs which may be acute or chronic as discussed above. No consolidative pneumonia. Mild cardiomegaly.  **This report has been created using voice recognition software. It may contain minor errors which are inherent in voice recognition technology. ** Final report electronically signed by Dr. Hannah Amador on 5/2/2020 3:36 AM        EKG: sinus tachycardia    Electronically signed by Javy Dixon MD on 5/3/2020 at 11:45 AM

## 2020-08-06 ENCOUNTER — TELEPHONE (OUTPATIENT)
Dept: INTERNAL MEDICINE CLINIC | Age: 65
End: 2020-08-06

## 2020-08-06 NOTE — TELEPHONE ENCOUNTER
Patient was on the line and stated that he did not know anything about this appointment with [facility] and hung up. He will be inactive in our clinic.

## 2021-01-28 ENCOUNTER — HOSPITAL ENCOUNTER (INPATIENT)
Age: 66
LOS: 7 days | Discharge: HOME | DRG: 871 | End: 2021-02-04
Attending: EMERGENCY MEDICINE | Admitting: INTERNAL MEDICINE
Payer: MEDICARE

## 2021-01-28 ENCOUNTER — APPOINTMENT (OUTPATIENT)
Dept: GENERAL RADIOLOGY | Age: 66
DRG: 871 | End: 2021-01-28
Payer: MEDICARE

## 2021-01-28 DIAGNOSIS — U07.1 COVID-19 VIRUS DETECTED: Primary | ICD-10-CM

## 2021-01-28 DIAGNOSIS — N17.9 AKI (ACUTE KIDNEY INJURY) (HCC): ICD-10-CM

## 2021-01-28 LAB
ABO: NORMAL
ALBUMIN SERPL-MCNC: 2.9 G/DL (ref 3.5–5.1)
ALP BLD-CCNC: 60 U/L (ref 38–126)
ALT SERPL-CCNC: 19 U/L (ref 11–66)
ANION GAP SERPL CALCULATED.3IONS-SCNC: 15 MEQ/L (ref 8–16)
ANTIBODY SCREEN: NORMAL
AST SERPL-CCNC: 52 U/L (ref 5–40)
BASOPHILS # BLD: 0.6 %
BASOPHILS ABSOLUTE: 0 THOU/MM3 (ref 0–0.1)
BILIRUB SERPL-MCNC: 0.9 MG/DL (ref 0.3–1.2)
BUN BLDV-MCNC: 45 MG/DL (ref 7–22)
CALCIUM SERPL-MCNC: 9.1 MG/DL (ref 8.5–10.5)
CHLORIDE BLD-SCNC: 92 MEQ/L (ref 98–111)
CO2: 30 MEQ/L (ref 23–33)
CREAT SERPL-MCNC: 3.1 MG/DL (ref 0.4–1.2)
EKG ATRIAL RATE: 119 BPM
EKG P AXIS: 45 DEGREES
EKG P-R INTERVAL: 162 MS
EKG Q-T INTERVAL: 314 MS
EKG QRS DURATION: 110 MS
EKG QTC CALCULATION (BAZETT): 441 MS
EKG R AXIS: 66 DEGREES
EKG T AXIS: 48 DEGREES
EKG VENTRICULAR RATE: 119 BPM
EOSINOPHIL # BLD: 0.1 %
EOSINOPHILS ABSOLUTE: 0 THOU/MM3 (ref 0–0.4)
ERYTHROCYTE [DISTWIDTH] IN BLOOD BY AUTOMATED COUNT: 12.2 % (ref 11.5–14.5)
ERYTHROCYTE [DISTWIDTH] IN BLOOD BY AUTOMATED COUNT: 42.3 FL (ref 35–45)
GLUCOSE BLD-MCNC: 102 MG/DL (ref 70–108)
GLUCOSE BLD-MCNC: 59 MG/DL (ref 70–108)
GLUCOSE BLD-MCNC: 76 MG/DL (ref 70–108)
GLUCOSE BLD-MCNC: 87 MG/DL (ref 70–108)
HCT VFR BLD CALC: 56.2 % (ref 42–52)
HEMOGLOBIN: 19.1 GM/DL (ref 14–18)
IMMATURE GRANS (ABS): 0.02 THOU/MM3 (ref 0–0.07)
IMMATURE GRANULOCYTES: 0.2 %
LYMPHOCYTES # BLD: 13.8 %
LYMPHOCYTES ABSOLUTE: 1.1 THOU/MM3 (ref 1–4.8)
MCH RBC QN AUTO: 31.9 PG (ref 26–33)
MCHC RBC AUTO-ENTMCNC: 34 GM/DL (ref 32.2–35.5)
MCV RBC AUTO: 94 FL (ref 80–94)
MONOCYTES # BLD: 4 %
MONOCYTES ABSOLUTE: 0.3 THOU/MM3 (ref 0.4–1.3)
NUCLEATED RED BLOOD CELLS: 0 /100 WBC
OSMOLALITY CALCULATION: 284.1 MOSMOL/KG (ref 275–300)
PLATELET # BLD: 267 THOU/MM3 (ref 130–400)
PMV BLD AUTO: 11.7 FL (ref 9.4–12.4)
POTASSIUM REFLEX MAGNESIUM: 4.4 MEQ/L (ref 3.5–5.2)
PROCALCITONIN: 0.39 NG/ML (ref 0.01–0.09)
RBC # BLD: 5.98 MILL/MM3 (ref 4.7–6.1)
RH FACTOR: NORMAL
SARS-COV-2, NAAT: DETECTED
SEG NEUTROPHILS: 81.3 %
SEGMENTED NEUTROPHILS ABSOLUTE COUNT: 6.6 THOU/MM3 (ref 1.8–7.7)
SODIUM BLD-SCNC: 137 MEQ/L (ref 135–145)
TOTAL PROTEIN: 7.7 G/DL (ref 6.1–8)
WBC # BLD: 8.1 THOU/MM3 (ref 4.8–10.8)

## 2021-01-28 PROCEDURE — 82948 REAGENT STRIP/BLOOD GLUCOSE: CPT

## 2021-01-28 PROCEDURE — 99285 EMERGENCY DEPT VISIT HI MDM: CPT

## 2021-01-28 PROCEDURE — 86901 BLOOD TYPING SEROLOGIC RH(D): CPT

## 2021-01-28 PROCEDURE — 94761 N-INVAS EAR/PLS OXIMETRY MLT: CPT

## 2021-01-28 PROCEDURE — 6370000000 HC RX 637 (ALT 250 FOR IP): Performed by: NURSE PRACTITIONER

## 2021-01-28 PROCEDURE — XW13325 TRANSFUSION OF CONVALESCENT PLASMA (NONAUTOLOGOUS) INTO PERIPHERAL VEIN, PERCUTANEOUS APPROACH, NEW TECHNOLOGY GROUP 5: ICD-10-PCS | Performed by: INTERNAL MEDICINE

## 2021-01-28 PROCEDURE — 85025 COMPLETE CBC W/AUTO DIFF WBC: CPT

## 2021-01-28 PROCEDURE — 2060000000 HC ICU INTERMEDIATE R&B

## 2021-01-28 PROCEDURE — 2500000003 HC RX 250 WO HCPCS: Performed by: NURSE PRACTITIONER

## 2021-01-28 PROCEDURE — 36415 COLL VENOUS BLD VENIPUNCTURE: CPT

## 2021-01-28 PROCEDURE — 94669 MECHANICAL CHEST WALL OSCILL: CPT

## 2021-01-28 PROCEDURE — 80053 COMPREHEN METABOLIC PANEL: CPT

## 2021-01-28 PROCEDURE — 86900 BLOOD TYPING SEROLOGIC ABO: CPT

## 2021-01-28 PROCEDURE — 2580000003 HC RX 258: Performed by: NURSE PRACTITIONER

## 2021-01-28 PROCEDURE — 2700000000 HC OXYGEN THERAPY PER DAY

## 2021-01-28 PROCEDURE — XW033E5 INTRODUCTION OF REMDESIVIR ANTI-INFECTIVE INTO PERIPHERAL VEIN, PERCUTANEOUS APPROACH, NEW TECHNOLOGY GROUP 5: ICD-10-PCS | Performed by: INTERNAL MEDICINE

## 2021-01-28 PROCEDURE — 86850 RBC ANTIBODY SCREEN: CPT

## 2021-01-28 PROCEDURE — P9059 PLASMA, FRZ BETWEEN 8-24HOUR: HCPCS

## 2021-01-28 PROCEDURE — 36430 TRANSFUSION BLD/BLD COMPNT: CPT

## 2021-01-28 PROCEDURE — 84145 PROCALCITONIN (PCT): CPT

## 2021-01-28 PROCEDURE — 71045 X-RAY EXAM CHEST 1 VIEW: CPT

## 2021-01-28 PROCEDURE — 93005 ELECTROCARDIOGRAM TRACING: CPT | Performed by: EMERGENCY MEDICINE

## 2021-01-28 PROCEDURE — 6360000002 HC RX W HCPCS: Performed by: NURSE PRACTITIONER

## 2021-01-28 PROCEDURE — U0002 COVID-19 LAB TEST NON-CDC: HCPCS

## 2021-01-28 PROCEDURE — 99223 1ST HOSP IP/OBS HIGH 75: CPT | Performed by: NURSE PRACTITIONER

## 2021-01-28 RX ORDER — AZITHROMYCIN 250 MG/1
250 TABLET, FILM COATED ORAL DAILY
Status: COMPLETED | OUTPATIENT
Start: 2021-01-29 | End: 2021-02-01

## 2021-01-28 RX ORDER — SODIUM CHLORIDE 0.9 % (FLUSH) 0.9 %
10 SYRINGE (ML) INJECTION PRN
Status: DISCONTINUED | OUTPATIENT
Start: 2021-01-28 | End: 2021-02-04 | Stop reason: HOSPADM

## 2021-01-28 RX ORDER — LISINOPRIL 10 MG/1
10 TABLET ORAL DAILY
Status: ON HOLD | COMMUNITY
End: 2021-02-04 | Stop reason: HOSPADM

## 2021-01-28 RX ORDER — POLYETHYLENE GLYCOL 3350 17 G/17G
17 POWDER, FOR SOLUTION ORAL DAILY PRN
Status: DISCONTINUED | OUTPATIENT
Start: 2021-01-28 | End: 2021-02-04 | Stop reason: HOSPADM

## 2021-01-28 RX ORDER — SODIUM CHLORIDE 0.9 % (FLUSH) 0.9 %
10 SYRINGE (ML) INJECTION EVERY 12 HOURS SCHEDULED
Status: DISCONTINUED | OUTPATIENT
Start: 2021-01-28 | End: 2021-02-04 | Stop reason: HOSPADM

## 2021-01-28 RX ORDER — HEPARIN SODIUM 5000 [USP'U]/ML
5000 INJECTION, SOLUTION INTRAVENOUS; SUBCUTANEOUS EVERY 8 HOURS
Status: DISCONTINUED | OUTPATIENT
Start: 2021-01-28 | End: 2021-02-04 | Stop reason: HOSPADM

## 2021-01-28 RX ORDER — SODIUM CHLORIDE 9 MG/ML
INJECTION, SOLUTION INTRAVENOUS PRN
Status: DISCONTINUED | OUTPATIENT
Start: 2021-01-28 | End: 2021-02-04 | Stop reason: HOSPADM

## 2021-01-28 RX ORDER — DEXAMETHASONE 4 MG/1
6 TABLET ORAL DAILY
Status: DISCONTINUED | OUTPATIENT
Start: 2021-01-28 | End: 2021-02-04 | Stop reason: HOSPADM

## 2021-01-28 RX ORDER — ASCORBIC ACID 500 MG
500 TABLET ORAL DAILY
Status: DISCONTINUED | OUTPATIENT
Start: 2021-01-28 | End: 2021-02-04 | Stop reason: HOSPADM

## 2021-01-28 RX ORDER — 0.9 % SODIUM CHLORIDE 0.9 %
30 INTRAVENOUS SOLUTION INTRAVENOUS PRN
Status: DISCONTINUED | OUTPATIENT
Start: 2021-01-28 | End: 2021-02-04 | Stop reason: HOSPADM

## 2021-01-28 RX ORDER — ZINC SULFATE 50(220)MG
50 CAPSULE ORAL DAILY
Status: DISCONTINUED | OUTPATIENT
Start: 2021-01-28 | End: 2021-02-04 | Stop reason: HOSPADM

## 2021-01-28 RX ORDER — VITAMIN B COMPLEX
2000 TABLET ORAL DAILY
Status: DISCONTINUED | OUTPATIENT
Start: 2021-01-28 | End: 2021-02-04 | Stop reason: HOSPADM

## 2021-01-28 RX ORDER — DEXTROSE MONOHYDRATE 25 G/50ML
12.5 INJECTION, SOLUTION INTRAVENOUS PRN
Status: DISCONTINUED | OUTPATIENT
Start: 2021-01-28 | End: 2021-02-04 | Stop reason: HOSPADM

## 2021-01-28 RX ORDER — ACETAMINOPHEN 650 MG/1
650 SUPPOSITORY RECTAL EVERY 6 HOURS PRN
Status: DISCONTINUED | OUTPATIENT
Start: 2021-01-28 | End: 2021-02-04 | Stop reason: HOSPADM

## 2021-01-28 RX ORDER — ACETAMINOPHEN 325 MG/1
650 TABLET ORAL EVERY 6 HOURS PRN
Status: DISCONTINUED | OUTPATIENT
Start: 2021-01-28 | End: 2021-02-04 | Stop reason: HOSPADM

## 2021-01-28 RX ORDER — ONDANSETRON 2 MG/ML
4 INJECTION INTRAMUSCULAR; INTRAVENOUS EVERY 6 HOURS PRN
Status: DISCONTINUED | OUTPATIENT
Start: 2021-01-28 | End: 2021-02-04 | Stop reason: HOSPADM

## 2021-01-28 RX ORDER — NICOTINE POLACRILEX 4 MG
15 LOZENGE BUCCAL PRN
Status: DISCONTINUED | OUTPATIENT
Start: 2021-01-28 | End: 2021-02-04 | Stop reason: HOSPADM

## 2021-01-28 RX ORDER — SODIUM CHLORIDE 9 MG/ML
INJECTION, SOLUTION INTRAVENOUS CONTINUOUS
Status: DISCONTINUED | OUTPATIENT
Start: 2021-01-28 | End: 2021-01-29

## 2021-01-28 RX ORDER — GUAIFENESIN/DEXTROMETHORPHAN 100-10MG/5
5 SYRUP ORAL EVERY 4 HOURS PRN
Status: DISCONTINUED | OUTPATIENT
Start: 2021-01-28 | End: 2021-02-04 | Stop reason: HOSPADM

## 2021-01-28 RX ORDER — PROMETHAZINE HYDROCHLORIDE 25 MG/1
12.5 TABLET ORAL EVERY 6 HOURS PRN
Status: DISCONTINUED | OUTPATIENT
Start: 2021-01-28 | End: 2021-02-04 | Stop reason: HOSPADM

## 2021-01-28 RX ORDER — DEXTROSE MONOHYDRATE 50 MG/ML
100 INJECTION, SOLUTION INTRAVENOUS PRN
Status: DISCONTINUED | OUTPATIENT
Start: 2021-01-28 | End: 2021-02-04 | Stop reason: HOSPADM

## 2021-01-28 RX ORDER — ALBUTEROL SULFATE 90 UG/1
2 AEROSOL, METERED RESPIRATORY (INHALATION) EVERY 6 HOURS PRN
Status: DISCONTINUED | OUTPATIENT
Start: 2021-01-28 | End: 2021-02-04 | Stop reason: HOSPADM

## 2021-01-28 RX ORDER — AZITHROMYCIN 250 MG/1
500 TABLET, FILM COATED ORAL DAILY
Status: COMPLETED | OUTPATIENT
Start: 2021-01-28 | End: 2021-01-28

## 2021-01-28 RX ADMIN — AZITHROMYCIN 500 MG: 250 TABLET, FILM COATED ORAL at 16:48

## 2021-01-28 RX ADMIN — HEPARIN SODIUM 5000 UNITS: 5000 INJECTION INTRAVENOUS; SUBCUTANEOUS at 17:14

## 2021-01-28 RX ADMIN — Medication 50 MG: at 16:48

## 2021-01-28 RX ADMIN — DEXAMETHASONE 6 MG: 4 TABLET ORAL at 16:48

## 2021-01-28 RX ADMIN — OXYCODONE HYDROCHLORIDE AND ACETAMINOPHEN 500 MG: 500 TABLET ORAL at 16:48

## 2021-01-28 RX ADMIN — Medication 2000 UNITS: at 16:48

## 2021-01-28 RX ADMIN — ACETAMINOPHEN 650 MG: 325 TABLET ORAL at 16:43

## 2021-01-28 RX ADMIN — REMDESIVIR 200 MG: 100 INJECTION, POWDER, LYOPHILIZED, FOR SOLUTION INTRAVENOUS at 20:42

## 2021-01-28 RX ADMIN — SODIUM CHLORIDE: 9 INJECTION, SOLUTION INTRAVENOUS at 14:36

## 2021-01-28 ASSESSMENT — ENCOUNTER SYMPTOMS
SORE THROAT: 0
COUGH: 1
VOMITING: 0
RHINORRHEA: 0
DIARRHEA: 0
CHEST TIGHTNESS: 1
ABDOMINAL PAIN: 0
NAUSEA: 0
CONSTIPATION: 0
SHORTNESS OF BREATH: 1

## 2021-01-28 ASSESSMENT — PAIN SCALES - GENERAL
PAINLEVEL_OUTOF10: 0
PAINLEVEL_OUTOF10: 5

## 2021-01-28 ASSESSMENT — PAIN DESCRIPTION - DESCRIPTORS: DESCRIPTORS: HEAVINESS

## 2021-01-28 ASSESSMENT — PAIN DESCRIPTION - PAIN TYPE: TYPE: ACUTE PAIN

## 2021-01-28 ASSESSMENT — PAIN DESCRIPTION - LOCATION: LOCATION: CHEST

## 2021-01-28 NOTE — ED NOTES
Pt transported to [facility] by cart in stable condition. Pt monitored on telemetry. Pt on High flow cannula  Called [facility] and informed them that the patient was on their way to the unit.       Nick Phillips, TAEN  [id number]/42/[id number]

## 2021-01-28 NOTE — ED NOTES
Pt given jello and water. Sitting on edge of bed with high flow in place. Reports he's getting more used to the high flow and is tolerable. Denies other needs. Will monitor.       Angel Marshall RN  01/28/21 1018

## 2021-01-28 NOTE — ED NOTES
ED to inpatient nurses report    Chief Complaint   Patient presents with    Chest Pain    Shortness of Breath    Concern For COVID-19      Present to ED from home  LOC: alert and orientated to name, place, date  Vital signs   Vitals:    01/28/21 0930 01/28/21 0939 01/28/21 0945 01/28/21 1003   BP:  (!) 133/103     Pulse: 117   117   Resp: (!) 39   30   Temp: 99.3 °F (37.4 °C)      TempSrc: Axillary      SpO2: (!) 68% 95% 96% 96%   Weight: 220 lb (99.8 kg)      Height: 6' 2\" (1.88 m)         Oxygen Baseline room air    Current needs required high flow   LDAs:   Peripheral IV 01/28/21 Right Antecubital (Active)   Site Assessment Clean;Dry; Intact 01/28/21 0940   Line Status Blood return noted;Specimen collected 01/28/21 0940   Dressing Status Clean;Dry; Intact 01/28/21 0940   Dressing Intervention New 01/28/21 0940     Mobility: Independent  Pending ED orders: none  Present condition: stable  Person of contract Philippe Bautista (girlfriend), phone number [phone]  Our promise was given to family    Electronically signed by Trenton Mena RN on 1/28/2021 at 11:04 AM       Trenton Mena RN  01/28/21 0104

## 2021-01-28 NOTE — H&P
History & Physical        Patient:  Krishna Mas  YOB: 1955    MRN: [id number]     Acct: [de-identified]    PCP: No primary care provider on file. Date of Admission: 1/28/2021    Date of Service: Pt seen/examined on 01/28/21  and Admitted to Inpatient with expected LOS greater than two midnights due to medical therapy. ASSESSMENT/PLAN:    1. COVID-19 infection--positive test on 1/28; Decadron 1/28, remdesivir 1/28, agrees to convalescent plasma on 1/28, add vitamin C, vitamin D and zinc, add incentive spirometry and Acapella, add PT and OT  2. Bilateral pneumonia (POA) likely secondary to COVID-19--see #1; add procalcitonin to evaluate for possible bacterial component as well  3. Acute hypoxic respiratory failure--had an initial O2 sat of 68% so was placed on high flow and is currently at 70% FiO2 and 60 L satting 97%; wean as able, add incentive spirometry and Acapella  4. JERMAINE--likely prerenal secondary to decreased p.o. intake; will add 0.9 normal saline at 75 mL's an hour for 12 hours only and re-evaluate; he needs to eat and drink  5. Polycythemia, likely secondary to dehydration--see #4; check in the morning  6. Sinus tachycardia--we will try gentle IV hydration and monitor; continue telemetry  7. Diabetes mellitus type 2, uncontrolled--we will hold p.o. hypoglycemics and add sliding scale level 2 along with hypoglycemia protocol and monitor  8. Essential hypertension, controlled--no home meds are listed  9.  Remote smoker quitting 2 to 3 weeks ago    Chief Complaint:  Chest pain and shortness of breath    History Of Present Illness: Allergies:  Patient has no known allergies. Social History:   reports that he quit smoking about 2 weeks ago. His smoking use included cigarettes. He smoked 0.75 packs per day. He has never used smokeless tobacco. He reports current alcohol use of about 2.0 standard drinks of alcohol per week. He reports current drug use. Drug: Cocaine. Family History:      Positive as follows:    History reviewed. No pertinent family history. REVIEW OF SYSTEMS:     Constitutional: ROS: positive for  - fatigue and malaise  Head: no headache, no head injury, no migraine   Eyes ROS: denies blurred/double vision  Ears ROS: no hearing difficulty, no tinnitus  Mouth and Throat ROS: no ulceration, dysphagia, dental caries  Psychological ROS: no depression, no anxiety, no panic attacks, denies suicide/homicide ideation  Endocrine ROS: denies polyuria, polydypsia, no heat or cold intolerance  Respiratory ROS: positive for - cough and shortness of breath  Cardiovascular ROS: no chest pain or dyspnea on exertion  Gastrointestinal ROS: positive for - appetite loss and nausea  Genito-Urinary ROS: denies dysuria, frequency, urgency; denies hematuria  Musculoskeletal ROS: negative  Neurological ROS: no syncope, no seizures, no numbness or tingling of hands, no numbness or tingling of feet, no paresis  Dermatology: no skin rash, no eczema  Endocrine: no polyuria, polydypsia, no heat/cold intolerance  Hematology: denies bruising easily, denies bleeding problems, denies clotting disorders    PHYSICAL EXAM:    BP (!) 133/103   Pulse 113   Temp 99.3 °F (37.4 °C) (Axillary)   Resp 30   Ht 6' 2\" (1.88 m)   Wt 220 lb (99.8 kg)   SpO2 96%   BMI 28.25 kg/m²     General appearance:  No apparent distress, appears stated age and cooperative. HEENT:  Normal cephalic, atraumatic without obvious deformity. Pupils equal, round, and reactive to light. Conjunctivae/corneas clear. 1. Bilateral diffuse interstitial opacities are present which may represent edema and/or pneumonia. However there are more focal consolidative opacities at the right greater than left lung bases which likely represents underlying pneumonia. Recommend continued follow-up. 2. Mild to moderate enlargement of the cardiomediastinal silhouette is noted. **This report has been created using voice recognition software. It may contain minor errors which are inherent in voice recognition technology. ** Final report electronically signed by Dr. Maksim Tineo on 1/28/2021 10:30 AM      EKG:  I have reviewed the EKG with the following interpretation: Sinus tachycardia heart rate 119 with artifact baseline      Thank you No primary care provider on file. for the opportunity to be involved in this patient's care.     Electronically signed by JOSE R Velez CNP on 1/28/2021 at 11:10 AM

## 2021-01-28 NOTE — ED NOTES
Pt to the ED from home with wife with CP and SOB ongoing x 2 weeks. COVID test yesterday pending result. Loss of appetite, cough (productive at times), fatigue and a headache. Pt has an axillary low grade temp. POX on RA 68%. Immediately placed on NRB at 15 LPM. Pt lips slightly blue when mask taken off. RT called and MD at bedside. EKG completed. Wife remains at bedside POX now WNL on NRB.       Oswald Castelan RN  01/28/21 2652

## 2021-01-28 NOTE — ED NOTES
Called [facility] and informed them that the patient is on their way to the unit. Patient transported via wheelchair in a stable condition.       Oc Roberson  01/28/21 1415

## 2021-01-28 NOTE — ACP (ADVANCE CARE PLANNING)
[] Portable Do Not Rescitate prepared for Provider review and signature  [] POLST/POST/MOLST/MOST prepared for Provider review and signature      Follow-up plan:    [] Schedule follow-up conversation to continue planning  [x] Referred individual to Provider for additional questions/concerns   [] Advised patient/agent/surrogate to review completed ACP document and update if needed with changes in condition, patient preferences or care setting    [] This note routed to one or more involved healthcare providers     - Adrian Alejandre was resting in his bed with high flow O2 provided to him. He shared how miserable he was feeling. It was obvious that he was struggling to breathe. - When discussing his EoL care choices he got very nervous asking if he was going to die. I assured him the questioning was not due to that rather to the treatment of patients early on with the virus. This seemed to give him comfort. I also went on to share scenarios in which he would want someone to be able to speak for him if he is unable. The patient stated understanding.   - The AD was not completed at this time but he was provided with information to contact the Spiritual Care Dept for assistance in the future if he would like to complete. No further follow-up at this time is necessary.

## 2021-01-28 NOTE — ED PROVIDER NOTES
[facility] [address] COMPLAINT       Chief Complaint   Patient presents with    Chest Pain    Shortness of Breath    Concern For COVID-19       Nurses Notes reviewed and I agree except as noted in the HPI. HISTORY OF PRESENT ILLNESS    Willow Garcia is a 77 y.o. male who presents emergency department for difficulty breathing, cough, fatigue and malaise, decreased appetite. Patient states that he has not had any fever. No vomiting or diarrhea. He denies any known recent contacts to anyone with COVID-19. He does report not feeling well and came out to the emergency department today due to feeling as though he was having difficulty breathing. No other initial complaints or concerns. REVIEW OF SYSTEMS     Review of Systems   Constitutional: Positive for appetite change and fatigue. Negative for diaphoresis and fever. HENT: Negative for congestion, rhinorrhea and sore throat. Eyes: Negative for visual disturbance. Respiratory: Positive for cough, chest tightness and shortness of breath. Cardiovascular: Negative for chest pain, palpitations and leg swelling. Gastrointestinal: Negative for abdominal pain, constipation, diarrhea, nausea and vomiting. Endocrine: Negative for polyuria. Genitourinary: Negative for dysuria. Musculoskeletal: Negative for joint swelling. Skin: Negative for rash. Neurological: Positive for headaches. Negative for dizziness, seizures, syncope, speech difficulty, weakness and numbness. Hematological: Negative for adenopathy. Psychiatric/Behavioral: Negative for confusion and self-injury. All other systems reviewed and are negative. PAST MEDICAL HISTORY    has a past medical history of Diabetes mellitus ([facility] [address] and Hypertension. SURGICAL HISTORY      has a past surgical history that includes Mandible fracture surgery.     CURRENT MEDICATIONS       Current Discharge Medication List CONTINUE these medications which have NOT CHANGED    Details   METFORMIN HCL PO Take 500 mg by mouth daily       lisinopril (PRINIVIL;ZESTRIL) 10 MG tablet Take 10 mg by mouth daily      glimepiride (AMARYL) 1 MG tablet Take 2 tablets by mouth every morning (before breakfast)  Qty: 30 tablet, Refills: 1      glucose monitoring kit (FREESTYLE) monitoring kit 1 kit by Does not apply route daily  Qty: 1 kit, Refills: 0      blood glucose monitor strips Test 3 times a day & as needed for symptoms of irregular blood glucose. Qty: 100 strip, Refills: 0    Comments: Brand per patient preference. May round up to next available package size. Associated Diagnoses: New onset type 2 diabetes mellitus ([facility] [address]      Lancets MISC 1 each by Does not apply route 2 times daily  Qty: 100 each, Refills: 5    Associated Diagnoses: New onset type 2 diabetes mellitus (HCC)      Alcohol Swabs (ALCOHOL PREP) 70 % PADS 3 times daily  Qty: 100 each, Refills: 11    Associated Diagnoses: New onset type 2 diabetes mellitus ([facility] [address]             ALLERGIES     has No Known Allergies. FAMILY HISTORY     has no family status information on file. family history is not on file. SOCIAL HISTORY      reports that he quit smoking about 2 weeks ago. His smoking use included cigarettes. He smoked 0.75 packs per day. He has never used smokeless tobacco. He reports current alcohol use of about 2.0 standard drinks of alcohol per week. He reports current drug use. Drug: Cocaine. PHYSICAL EXAM     INITIAL VITALS:  height is 6' 2\" (1.88 m) and weight is 220 lb (99.8 kg). His oral temperature is 100.4 °F (38 °C). His blood pressure is 131/90 (abnormal) and his pulse is 112. His respiration is 28 and oxygen saturation is 95%. Constitutional: Well-nourished, no distress evident. HEENT: Normocephalic, normal hearing, EOMI, speech clear. Neck: Soft supple. Trachea midline. Comments:      Patient required high flow NC due to critically low O2 upon arrival, in the setting of COVID 19        CONSULTS:  hospitalist    PROCEDURES:  None    FINAL IMPRESSION      1. COVID-19 virus detected          DISPOSITION/PLAN   admit    PATIENT REFERRED TO:  No follow-up provider specified. DISCHARGE MEDICATIONS:  Current Discharge Medication List          (Please note that portions of this note were completed with a voice recognition program.  Efforts were made to edit the dictations but occasionally words are mis-transcribed.)    Provider:  I personally performed the services described in the documentation, reviewed and edited the documentation which was dictated, and it accurately records my words and actions.     Daniel Pinto MD 1/28/21 3:46 PM                Daniel Pinto MD  01/28/21 6023

## 2021-01-29 ENCOUNTER — APPOINTMENT (OUTPATIENT)
Dept: ULTRASOUND IMAGING | Age: 66
DRG: 871 | End: 2021-01-29
Payer: MEDICARE

## 2021-01-29 LAB
ALBUMIN SERPL-MCNC: 2.4 G/DL (ref 3.5–5.1)
ALP BLD-CCNC: 48 U/L (ref 38–126)
ALT SERPL-CCNC: 17 U/L (ref 11–66)
AMORPHOUS: ABNORMAL
AMPHETAMINE+METHAMPHETAMINE URINE SCREEN: NEGATIVE
ANION GAP SERPL CALCULATED.3IONS-SCNC: 14 MEQ/L (ref 8–16)
AST SERPL-CCNC: 38 U/L (ref 5–40)
BACTERIA: ABNORMAL
BARBITURATE QUANTITATIVE URINE: NEGATIVE
BASOPHILS # BLD: 0.4 %
BASOPHILS ABSOLUTE: 0 THOU/MM3 (ref 0–0.1)
BENZODIAZEPINE QUANTITATIVE URINE: NEGATIVE
BILIRUB SERPL-MCNC: 0.4 MG/DL (ref 0.3–1.2)
BILIRUBIN URINE: NEGATIVE
BLOOD, URINE: ABNORMAL
BUN BLDV-MCNC: 58 MG/DL (ref 7–22)
C-REACTIVE PROTEIN: 20.91 MG/DL (ref 0–1)
CALCIUM SERPL-MCNC: 8.3 MG/DL (ref 8.5–10.5)
CANNABINOID QUANTITATIVE URINE: NEGATIVE
CASTS: ABNORMAL /LPF
CASTS: ABNORMAL /LPF
CHARACTER, URINE: ABNORMAL
CHLORIDE BLD-SCNC: 93 MEQ/L (ref 98–111)
CHLORIDE, URINE: < 20 MEQ/L
CO2: 28 MEQ/L (ref 23–33)
COCAINE METABOLITE QUANTITATIVE URINE: POSITIVE
COLOR: YELLOW
CREAT SERPL-MCNC: 4.1 MG/DL (ref 0.4–1.2)
CREATININE URINE: 230.5 MG/DL
CREATININE, URINE: 230.5 MG/DL
CRYSTALS: ABNORMAL
D-DIMER QUANTITATIVE: 1060 NG/ML FEU (ref 0–500)
EOSINOPHIL # BLD: 0 %
EOSINOPHIL SMEAR: NORMAL
EOSINOPHILS ABSOLUTE: 0 THOU/MM3 (ref 0–0.4)
EPITHELIAL CELLS, UA: ABNORMAL /HPF
ERYTHROCYTE [DISTWIDTH] IN BLOOD BY AUTOMATED COUNT: 12.2 % (ref 11.5–14.5)
ERYTHROCYTE [DISTWIDTH] IN BLOOD BY AUTOMATED COUNT: 42.8 FL (ref 35–45)
FERRITIN: 594 NG/ML (ref 22–322)
FIBRINOGEN: 782 MG/100ML (ref 155–475)
GLUCOSE BLD-MCNC: 136 MG/DL (ref 70–108)
GLUCOSE BLD-MCNC: 154 MG/DL (ref 70–108)
GLUCOSE BLD-MCNC: 159 MG/DL (ref 70–108)
GLUCOSE BLD-MCNC: 206 MG/DL (ref 70–108)
GLUCOSE BLD-MCNC: 210 MG/DL (ref 70–108)
GLUCOSE, URINE: NEGATIVE MG/DL
HCT VFR BLD CALC: 49.3 % (ref 42–52)
HEMOGLOBIN: 16.4 GM/DL (ref 14–18)
IMMATURE GRANS (ABS): 0.03 THOU/MM3 (ref 0–0.07)
IMMATURE GRANULOCYTES: 0.5 %
KETONES, URINE: ABNORMAL
LACTIC ACID: 1.4 MMOL/L (ref 0.5–2.2)
LD: 699 U/L (ref 100–190)
LEUKOCYTE ESTERASE, URINE: ABNORMAL
LYMPHOCYTES # BLD: 12.3 %
LYMPHOCYTES ABSOLUTE: 0.7 THOU/MM3 (ref 1–4.8)
MCH RBC QN AUTO: 31.6 PG (ref 26–33)
MCHC RBC AUTO-ENTMCNC: 33.3 GM/DL (ref 32.2–35.5)
MCV RBC AUTO: 95 FL (ref 80–94)
MICROALBUMIN UR-MCNC: 158.41 MG/DL
MICROALBUMIN/CREAT UR-RTO: 687 MG/G (ref 0–30)
MISCELLANEOUS LAB TEST RESULT: ABNORMAL
MONOCYTES # BLD: 4.1 %
MONOCYTES ABSOLUTE: 0.2 THOU/MM3 (ref 0.4–1.3)
NITRITE, URINE: NEGATIVE
NUCLEATED RED BLOOD CELLS: 0 /100 WBC
OPIATES, URINE: NEGATIVE
OXYCODONE: NEGATIVE
PH UA: 5 (ref 5–9)
PHENCYCLIDINE QUANTITATIVE URINE: NEGATIVE
PLATELET # BLD: 256 THOU/MM3 (ref 130–400)
PMV BLD AUTO: 11.6 FL (ref 9.4–12.4)
POTASSIUM REFLEX MAGNESIUM: 4.1 MEQ/L (ref 3.5–5.2)
POTASSIUM, URINE: 39.2 MEQ/L
PROCALCITONIN: 0.7 NG/ML (ref 0.01–0.09)
PROT/CREAT RATIO, UR: 0.94
PROTEIN UA: 300 MG/DL
PROTEIN, URINE: 216.2 MG/DL
RBC # BLD: 5.19 MILL/MM3 (ref 4.7–6.1)
RBC URINE: ABNORMAL /HPF
RENAL EPITHELIAL, UA: ABNORMAL
SEG NEUTROPHILS: 82.7 %
SEGMENTED NEUTROPHILS ABSOLUTE COUNT: 4.6 THOU/MM3 (ref 1.8–7.7)
SODIUM BLD-SCNC: 135 MEQ/L (ref 135–145)
SODIUM URINE: 43 MEQ/L
SPECIFIC GRAVITY UA: 1.02 (ref 1–1.03)
SPECIMEN: NORMAL
TOTAL PROTEIN: 6.4 G/DL (ref 6.1–8)
TROPONIN T: < 0.01 NG/ML
UROBILINOGEN, URINE: 1 EU/DL (ref 0–1)
WBC # BLD: 5.6 THOU/MM3 (ref 4.8–10.8)
WBC UA: ABNORMAL /HPF
YEAST: ABNORMAL

## 2021-01-29 PROCEDURE — 76770 US EXAM ABDO BACK WALL COMP: CPT

## 2021-01-29 PROCEDURE — 82436 ASSAY OF URINE CHLORIDE: CPT

## 2021-01-29 PROCEDURE — 82570 ASSAY OF URINE CREATININE: CPT

## 2021-01-29 PROCEDURE — 81001 URINALYSIS AUTO W/SCOPE: CPT

## 2021-01-29 PROCEDURE — 84133 ASSAY OF URINE POTASSIUM: CPT

## 2021-01-29 PROCEDURE — 84484 ASSAY OF TROPONIN QUANT: CPT

## 2021-01-29 PROCEDURE — 82043 UR ALBUMIN QUANTITATIVE: CPT

## 2021-01-29 PROCEDURE — 80053 COMPREHEN METABOLIC PANEL: CPT

## 2021-01-29 PROCEDURE — 85025 COMPLETE CBC W/AUTO DIFF WBC: CPT

## 2021-01-29 PROCEDURE — 80307 DRUG TEST PRSMV CHEM ANLYZR: CPT

## 2021-01-29 PROCEDURE — 85385 FIBRINOGEN ANTIGEN: CPT

## 2021-01-29 PROCEDURE — 97110 THERAPEUTIC EXERCISES: CPT

## 2021-01-29 PROCEDURE — 89190 NASAL SMEAR FOR EOSINOPHILS: CPT

## 2021-01-29 PROCEDURE — 97162 PT EVAL MOD COMPLEX 30 MIN: CPT

## 2021-01-29 PROCEDURE — 86140 C-REACTIVE PROTEIN: CPT

## 2021-01-29 PROCEDURE — 83605 ASSAY OF LACTIC ACID: CPT

## 2021-01-29 PROCEDURE — 2060000000 HC ICU INTERMEDIATE R&B

## 2021-01-29 PROCEDURE — 82728 ASSAY OF FERRITIN: CPT

## 2021-01-29 PROCEDURE — 99233 SBSQ HOSP IP/OBS HIGH 50: CPT | Performed by: NURSE PRACTITIONER

## 2021-01-29 PROCEDURE — 97535 SELF CARE MNGMENT TRAINING: CPT

## 2021-01-29 PROCEDURE — 85379 FIBRIN DEGRADATION QUANT: CPT

## 2021-01-29 PROCEDURE — 2580000003 HC RX 258: Performed by: NURSE PRACTITIONER

## 2021-01-29 PROCEDURE — 83615 LACTATE (LD) (LDH) ENZYME: CPT

## 2021-01-29 PROCEDURE — 99222 1ST HOSP IP/OBS MODERATE 55: CPT | Performed by: INTERNAL MEDICINE

## 2021-01-29 PROCEDURE — 6370000000 HC RX 637 (ALT 250 FOR IP): Performed by: NURSE PRACTITIONER

## 2021-01-29 PROCEDURE — 82948 REAGENT STRIP/BLOOD GLUCOSE: CPT

## 2021-01-29 PROCEDURE — 6360000002 HC RX W HCPCS: Performed by: NURSE PRACTITIONER

## 2021-01-29 PROCEDURE — P9059 PLASMA, FRZ BETWEEN 8-24HOUR: HCPCS

## 2021-01-29 PROCEDURE — 84156 ASSAY OF PROTEIN URINE: CPT

## 2021-01-29 PROCEDURE — 97166 OT EVAL MOD COMPLEX 45 MIN: CPT

## 2021-01-29 PROCEDURE — 84300 ASSAY OF URINE SODIUM: CPT

## 2021-01-29 PROCEDURE — 84145 PROCALCITONIN (PCT): CPT

## 2021-01-29 PROCEDURE — 2700000000 HC OXYGEN THERAPY PER DAY

## 2021-01-29 PROCEDURE — 36415 COLL VENOUS BLD VENIPUNCTURE: CPT

## 2021-01-29 PROCEDURE — 94761 N-INVAS EAR/PLS OXIMETRY MLT: CPT

## 2021-01-29 PROCEDURE — 36430 TRANSFUSION BLD/BLD COMPNT: CPT

## 2021-01-29 RX ORDER — SODIUM CHLORIDE 9 MG/ML
INJECTION, SOLUTION INTRAVENOUS CONTINUOUS
Status: DISCONTINUED | OUTPATIENT
Start: 2021-01-29 | End: 2021-02-01

## 2021-01-29 RX ORDER — SODIUM CHLORIDE 9 MG/ML
INJECTION, SOLUTION INTRAVENOUS PRN
Status: DISCONTINUED | OUTPATIENT
Start: 2021-01-29 | End: 2021-02-04 | Stop reason: HOSPADM

## 2021-01-29 RX ADMIN — AZITHROMYCIN 250 MG: 250 TABLET, FILM COATED ORAL at 08:00

## 2021-01-29 RX ADMIN — Medication 2000 UNITS: at 07:59

## 2021-01-29 RX ADMIN — OXYCODONE HYDROCHLORIDE AND ACETAMINOPHEN 500 MG: 500 TABLET ORAL at 08:00

## 2021-01-29 RX ADMIN — HEPARIN SODIUM 5000 UNITS: 5000 INJECTION INTRAVENOUS; SUBCUTANEOUS at 10:15

## 2021-01-29 RX ADMIN — Medication 50 MG: at 07:59

## 2021-01-29 RX ADMIN — HEPARIN SODIUM 5000 UNITS: 5000 INJECTION INTRAVENOUS; SUBCUTANEOUS at 17:40

## 2021-01-29 RX ADMIN — DEXAMETHASONE 6 MG: 4 TABLET ORAL at 07:59

## 2021-01-29 RX ADMIN — CEFTRIAXONE SODIUM 1000 MG: 1 INJECTION, POWDER, FOR SOLUTION INTRAMUSCULAR; INTRAVENOUS at 12:42

## 2021-01-29 RX ADMIN — INSULIN LISPRO 4 UNITS: 100 INJECTION, SOLUTION INTRAVENOUS; SUBCUTANEOUS at 17:39

## 2021-01-29 RX ADMIN — HEPARIN SODIUM 5000 UNITS: 5000 INJECTION INTRAVENOUS; SUBCUTANEOUS at 05:08

## 2021-01-29 ASSESSMENT — ENCOUNTER SYMPTOMS
BACK PAIN: 0
DIARRHEA: 0
CHEST TIGHTNESS: 1
SHORTNESS OF BREATH: 1
SORE THROAT: 0
ABDOMINAL PAIN: 1
COUGH: 0
EYE PAIN: 0
EYES NEGATIVE: 1
NAUSEA: 1
VOMITING: 0

## 2021-01-29 NOTE — CARE COORDINATION
DISASTER CHARTING    1/29/21, 7:40 AM EST    DISCHARGE ONGOING EVALUATION:     Judith Porter day: 1  Location: 8A-11/011-A Reason for admit: COVID-19 virus detected [U07.1]   Barriers to Discharge: To ER with SOB and CP. Found to be Covid +. Hypoxic. Imaging reveals possible pneumonia. Creat 3.1. IVF at 50/hr. Zithromax, Decadron, Remdesivir, Vits. Consult to Nephrology. Afebrile. On HHF 60% and 50L with sat 93%. PCP: Keenan Antonio MD  Readmission Risk Score: 15%  Patient Goals/Plan/Treatment Preferences: Met with tp today. He is from home where he has a room mate, Birdie Javier whom he shares expenses with. Pt has no current services or DME. He currently does not have a license but uses [employer]. He has no issues getting meds or basic needs. He has a PCP. Advised CM will follow for discharge needs.

## 2021-01-29 NOTE — FLOWSHEET NOTE
[facility]. [facility] PROGRESS NOTE      Patient: Luis Fernando Mackenzie  Room #: 8A-11/011-A            YOB: 1955  Age: 77 y.o. Gender: male            Admit Date & Time: 1/28/2021  9:25 AM    Assessment:  ed during this visit. Patient told me he has grown children and siblings who are providing family support. Patient told me would like to review and read through the document so he can make the best decision for health Care Planing. Patient asked me to give him copy of the HCPOA/LW document. Patient also told me he is thinking about putting his sister down as his POA when the document is filled out. Patient hope to be ready to get help by Sunday or Monday. Patient has no [employer]ian[employer] affiliation at this time. Interventions:  Advance Directive Consult: Advance Directive materials were provided to patient and explained. Patient is not ready to complete at this time, gave information for Spiritual Care to be contacted if further assistance is needed. Outcomes:  Pt is grateful that  staff explained documents and answered questions from patient. Patient also shared that he is hopeful that he will continue to get better and be able to leave the  Hospital. Patient engaged in conversation during this visit. Plan:  1. [facility] will follow up to assist patient in filling out document upon patient's request. SC service remain available to patient at this time.      Electronically signed by Barry Hoover on 1/29/2021 at 6:53 PM.  [address] [place] [address]  [phone]

## 2021-01-29 NOTE — PROGRESS NOTES
Overall Orientation Status: Within Functional Limits    Vision: Within Functional Limits    Hearing: Within functional limits         Pain: denies    Social/Functional History:    Lives With: Other (comment)(friend)  Type of Home: Apartment  Home Layout: One level  Home Access: Stairs to enter with rails  Entrance Stairs - Number of Steps: 3  Entrance Stairs - Rails: Left  Home Equipment: (none)     Bathroom Shower/Tub: Tub only(takes baths)  Bathroom Toilet: Standard       ADL Assistance: Independent  Homemaking Assistance: Independent  Homemaking Responsibilities: Yes  Ambulation Assistance: Independent  Transfer Assistance: Independent    Active : (pt reports had license taken away)     Additional Comments: Patient reports indepenent at [facility] with no AD    OBJECTIVE:  Range of Motion:  Right Lower Extremity: WFL  Left Lower Extremity: WFL    Strength:  Right Lower Extremity: Impaired-grossly 4/5  Left Lower Extremity: Impaired - grossly 4/5    Balance:  Static Sitting Balance:  Independent  Dynamic Sitting Balance: Supervision  Static Standing Balance: Stand By Assistance  Dynamic Standing Balance: Stand By Assistance, Contact Guard Assistance    Bed Mobility:  Supine to Sit: Stand By Assistance  Sit to Supine: Stand By Assistance    Assist with O2 and IV lines, head of bed elevated    Transfers:  Sit to Stand: Stand By Assistance  Stand to Sit:Stand By Assistance    Ambulation:  Stand By Assistance, [employer] Assistance  Distance: 3' forward and back, 4' side step to right and left x3  Surface: Level Tile  Device:No Device  Gait Deviations: Forward Flexed Posture, Slow Chacha, Decreased Step Length Bilaterally, Decreased Gait Speed and Decreased Heel Strike Bilaterally  Education on ambulating with staff as O2 needs decrease    Exercise:  Patient was guided in 1 set(s) 12 reps of exercise to both lower extremities. Seated: long arc quad, march, ankle pumps, hip abduction. Supine: glut sets 5\" hold, heel slides, hip abduction, ankle pumps. Exercises written on board and pt educated on completing  Exercises were completed for increased independence with functional mobility. Functional Outcome Measures: Completed  AM-PAC Inpatient Mobility Raw Score : 16  AM-PAC Inpatient T-Scale Score : 40.78    ASSESSMENT:  Activity Tolerance:  Patient tolerance of  treatment: good. Pt on high flow, flow rate 50 L/min and FiO2 60. Treatment Initiated: Treatment and education initiated within context of evaluation. Evaluation time included review of current medical information, gathering information related to past medical, social and functional history, completion of standardized testing, formal and informal observation of tasks, assessment of data and development of plan of care and goals. Treatment time included skilled education and facilitation of tasks to increase safety and independence with functional mobility for improved independence and quality of life. Therapeutic exercise completed to improve patient's lower extremity strength. Assessment: Body structures, Functions, Activity limitations: Decreased functional mobility , Decreased balance, Decreased endurance, Decreased strength  Assessment: The evaluation of Mr. Lissette Riddle indicates a decline in functional mobility compared to baseline. Patient independent at [facility] with no AD, requiring SBA/CGA at this time, limited to ~4' with ambulation due to O2 needs. Patient would benefit from skilled PT services to improve his ability to complete functional mobility, reduce his risk for falls and allow patient to return to [facility].   Prognosis: Good   Co-morbidities: DM, HTN  Pt requiring high flow O2 and assist with mobility for safety    REQUIRES PT FOLLOW UP: Yes    Discharge Recommendations: Discharge Recommendations: Continue to assess pending progress, Patient would benefit from continued therapy after discharge    Patient Education:  PT Education: PT Role, Plan of Care, Goals, Home Exercise Program, Functional Mobility Training    Equipment Recommendations:  Equipment Needed: (continue to assess)    Plan:  Times per week: 5xGM  Times per day: Daily  Current Treatment Recommendations: Strengthening, Transfer Training, Endurance Training, Neuromuscular Re-education, Patient/Caregiver Education & Training, Balance Training, Gait Training, Home Exercise Program, Functional Mobility Training, Stair training, Safety Education & Training    Goals:  Patient goals : go home  Short term goals  Time Frame for Short term goals: at discharge  Short term goal 1: Patient will complete sit < > stand wtih independence to stand to ambulate safely  Short term goal 2: Patient will ambulate 100' with no AD with modified independence to navigate home safely. Short term goal 3: Patient will ascend/descend 3 steps with 1 hand rail and SBA to access/leave home. Long term goals  Time Frame for Long term goals : N/A due to short estimated length of stay    Following session, patient left in safe position with all fall risk precautions in place.

## 2021-01-29 NOTE — PROGRESS NOTES
Hospitalist Progress Note    Patient:  John Osborne      Unit/Bed:8A-11/011-A    YOB: 1955    MRN: [id number]       Acct: [de-identified]     PCP: No primary care provider on file. Date of Admission: 1/28/2021    Assessment/Plan:    1. COVID-19 infection--positive test on 1/28; Decadron 1/28, remdesivir 1/28 x 1 dose and holding secondary to #4, convalescent plasma on 1/28 and will order another dose today, vitamin C, vitamin D and zinc, incentive spirometry and Acapella, PT and OT; ferritin at 594; dimer at 1060  2. Bilateral pneumonia (POA) likely secondary to COVID-19, possibly bacterial component--see #1; procalcitonin 0.70; Zithromax 1/28, add Rocephin 1/29  3. Sepsis (POA) likely secondary to #1, #2--T-max 103.1; heart rate and respiratory rate are improved; monitor  4. Acute hypoxic respiratory failure--had an initial O2 sat of 68%; on high flow and is currently at 60% FiO2 and 50 L satting 93%; wean as able,  incentive spirometry and Acapella  5. JERMAINE--likely prerenal secondary to decreased p.o. intake; will add 0.9 normal saline at 50 mL's an hour; consult nephrology as creatinine is up to 4.1 from 3.1; was on lisinopril at home however that was not reordered here; avoid hypotension and nephrotoxic agents  6. Polycythemia, likely secondary to dehydration--see #4;  improved  7. Sinus tachycardia--improved; continue telemetry  8. Diabetes mellitus type 2, uncontrolled--holding p.o. hypoglycemics; on sliding scale level 2 along with hypoglycemia protocol; monitor  9. Essential hypertension, controlled--takes lisinopril at home however that is on hold  10. Remote smoker quitting 2 to 3 weeks ago  11. Cocaine abuse--checked urine drug screen and positive; history of same May 2, 2020     Expected discharge date:  To be determined    Disposition:    [x] Home       [] TCU       [] Rehab       [] Psych       [] SNF       [] [place]       [] Other- pt. resting quietly, nad, assessment on-going Chief Complaint: Chest pain and shortness of breath    Hospital Course:  77 y.o. male who presented to [address] [place] [address] with chest pain and shortness of breath; has medical history includes diabetes mellitus and he quit smoking 2 to 3 weeks ago; states for the past week he has had intermittent chest tightness along with lightheadedness and dizziness, occasional cough, shortness of breath, nausea and decreased oral intake; also relates to feeling quite fatigued; symptoms continuedso he presented to the emergency department for evaluation; upon initial assessment his O2 sat was 68% so he was placed on high flow with rapid improvement; he was tachypneic initially at 39 with a heart rate of 117 and since has improved to 28; he was found to be Covid positive; he is alert and oriented and speaking in complete sentences without any distress at my time of evaluation; he denies any significant pain; unsure of his Covid contact; denies any loss of taste or smell; he is being admitted to the hospital service for further care and evaluation.     1/29--> currently on high flow at 60% FiO2 and 50 L and satting 95%; creatinine increased to 4.1 from 3.1 so nephrology was consulted and appreciate their input; urine drug screen positive for cocaine    Subjective (past 24 hours): States overall he is feeling much better, feels his cough is worse in the morning    Medications:  Reviewed    Infusion Medications    sodium chloride      dextrose       Scheduled Medications    sodium chloride flush  10 mL Intravenous 2 times per day    dexamethasone  6 mg Oral Daily    Vitamin D  2,000 Units Oral Daily    ascorbic acid  500 mg Oral Daily    zinc sulfate  50 mg Oral Daily    insulin lispro  0-12 Units Subcutaneous TID     insulin lispro  0-6 Units Subcutaneous Nightly    heparin (porcine)  5,000 Units Subcutaneous Q8H    azithromycin  250 mg Oral Daily    remdesivir IVPB  100 mg Intravenous Q24H PRN Meds: sodium chloride flush, promethazine **OR** ondansetron, polyethylene glycol, acetaminophen **OR** acetaminophen, guaiFENesin-dextromethorphan, sodium chloride, glucose, dextrose, glucagon (rDNA), dextrose, albuterol sulfate HFA, sodium chloride      Intake/Output Summary (Last 24 hours) at 1/29/2021 0654  Last data filed at 1/29/2021 0446  Gross per 24 hour   Intake 1649.15 ml   Output 275 ml   Net 1374.15 ml       Diet:  DIET CARB CONTROL; Exam:  /80   Pulse 87   Temp 98.3 °F (36.8 °C) (Oral)   Resp 21   Ht 6' 2\" (1.88 m)   Wt 220 lb (99.8 kg)   SpO2 93%   BMI 28.25 kg/m²     General appearance: No apparent distress, appears stated age and cooperative. HEENT: Pupils equal, round, and reactive to light. Conjunctivae/corneas clear. Neck: Supple, with full range of motion. No jugular venous distention. Trachea midline. Respiratory:  Normal respiratory effort. Faint rhonchi to auscultation, bilaterally; able to speak in complete sentences and not tachypneic  Cardiovascular: Regular rate and rhythm with normal S1/S2 without murmurs, rubs or gallops. Abdomen: Soft, non-tender, non-distended with normal bowel sounds. Musculoskeletal: passive and active ROM x 4 extremities. Skin: Skin color, texture, turgor normal.    Neurologic:  Neurovascularly intact without any focal sensory/motor deficits.  Cranial nerves: II-XII intact, grossly non-focal.  Psychiatric: Alert and oriented x 4, thought content appropriate  Capillary Refill: Brisk,< 3 seconds   Peripheral Pulses: +2 palpable, equal bilaterally       Labs:   Recent Labs     01/28/21  0930 01/29/21  0541   WBC 8.1 5.6   HGB 19.1* 16.4   HCT 56.2* 49.3    256     Recent Labs     01/28/21  0930 01/29/21  0541    135   K 4.4 4.1   CL 92* 93*   CO2 30 28   BUN 45* 58*   CREATININE 3.1* 4.1*   CALCIUM 9.1 8.3*     Recent Labs     01/28/21  0930 01/29/21  0541   AST 52* 38   ALT 19 17   BILITOT 0.9 0.4   ALKPHOS 60 48

## 2021-01-29 NOTE — CONSULTS
 Years of education: Not on file    Highest education level: Not on file   Occupational History    Not on file   Social Needs    Financial resource strain: Not on file    Food insecurity     Worry: Not on file     Inability: Not on file    Transportation needs     Medical: Not on file     Non-medical: Not on file   Tobacco Use    Smoking status: Former Smoker     Packs/day: 0.75     Types: Cigarettes     Quit date: 2021     Years since quittin.0    Smokeless tobacco: Never Used   Substance and Sexual Activity    Alcohol use: Yes     Alcohol/week: 2.0 standard drinks     Types: 2 Cans of beer per week     Comment: 24 oz Daily     Drug use: Yes     Types: Cocaine     Comment: Last used 1 week ago- Smokes not snorted     Sexual activity: Not on file   Lifestyle    Physical activity     Days per week: Not on file     Minutes per session: Not on file    Stress: Not on file   Relationships    Social connections     Talks on phone: Not on file     Gets together: Not on file     Attends Pentecostal service: Not on file     Active member of club or organization: Not on file     Attends meetings of clubs or organizations: Not on file     Relationship status: Not on file    Intimate partner violence     Fear of current or ex partner: Not on file     Emotionally abused: Not on file     Physically abused: Not on file     Forced sexual activity: Not on file   Other Topics Concern    Not on file   Social History Narrative    Not on file     History reviewed. No pertinent family history. Medications & Allergies      Prior to Admission medications    Medication Sig Start Date End Date Taking?  Authorizing Provider   METFORMIN HCL PO Take 500 mg by mouth daily    Yes Historical Provider, MD   lisinopril (PRINIVIL;ZESTRIL) 10 MG tablet Take 10 mg by mouth daily   Yes Historical Provider, MD   glimepiride (AMARYL) 1 MG tablet Take 2 tablets by mouth every morning (before breakfast) 5/3/20  Yes Carmella Mann MD Appearance: Normal appearance. He is ill-appearing and diaphoretic. HENT:      Head: Normocephalic and atraumatic. Right Ear: External ear normal.      Left Ear: External ear normal.      Nose: Nose normal.      Mouth/Throat:      Mouth: Mucous membranes are moist.   Eyes:      General: No scleral icterus. Right eye: No discharge. Left eye: No discharge. Conjunctiva/sclera: Conjunctivae normal.   Neck:      Musculoskeletal: Normal range of motion and neck supple. Thyroid: No thyromegaly. Vascular: No JVD. Cardiovascular:      Rate and Rhythm: Normal rate and regular rhythm. Heart sounds: Normal heart sounds. No murmur. Pulmonary:      Effort: Pulmonary effort is normal. No respiratory distress. Breath sounds: Normal breath sounds. No stridor. No wheezing or rales. Chest:      Chest wall: No tenderness. Abdominal:      General: Bowel sounds are normal. There is no distension. Palpations: Abdomen is soft. Tenderness: There is no abdominal tenderness. Musculoskeletal:         General: No swelling or tenderness. Right lower leg: No edema. Left lower leg: No edema. Skin:     General: Skin is warm. Findings: No erythema or rash. Neurological:      General: No focal deficit present. Mental Status: He is alert and oriented to person, place, and time.    Psychiatric:         Mood and Affect: Mood normal.         Behavior: Behavior normal.           Vitals:    01/29/21 0908   BP:    Pulse:    Resp: 24   Temp:    SpO2: 95%     Labs, Radiology and Tests       Recent Labs     01/28/21  0930 01/29/21  0541   WBC 8.1 5.6   RBC 5.98 5.19   HGB 19.1* 16.4   HCT 56.2* 49.3   MCV 94.0 95.0*   MCH 31.9 31.6   MCHC 34.0 33.3    256     Recent Labs     01/28/21  0930 01/29/21  0541    135   K 4.4 4.1   CL 92* 93*   CO2 30 28   BUN 45* 58*   CREATININE 3.1* 4.1*   CALCIUM 9.1 8.3*   PROT 7.7 6.4   LABALBU 2.9* 2.4*   BILITOT 0.9 0.4 ALKPHOS 60 48   AST 52* 38   ALT 19 17       Radiology : Chest x-ray-reviewed by me shows bilateral opacities right greater than left. No significant cardiomegaly cannot rule out any component of congestion at this time    Other : Old laboratory data have been reviewed and noted patient's baseline creatinine was around 0.9 in May 2020    Assessment    1 Renal -acute kidney injury, most likely secondary to multifactorial etiology primarily due to the extensive use of nonsteroidal anti-inflammatories combined with ACE inhibitor. May have some component of prerenal as well. ? Does not look overtly congested at this time continue IV fluids  ? Send urine electrolytes, urinalysis, urine eosinophils  ? Also obtain a baseline renal ultrasound scan    2 Electrolytes -appear to be within normal limits  3 Acid-base status-appears to be stable  4 Essential hypertension running well  5 COVID-19 pneumonia  6 Hx of diabetes mellitus  7 Meds reviewed and discussed with patient and hospitalist team.      **This report has been created using voice recognition software. It maycontain minor  errors which are inherent in voice recognition technology. **    Pranay Klein M.D  [facility].

## 2021-01-29 NOTE — PROGRESS NOTES
Per pt's nurse, pt's physical condition would prohibit being able to complete AD tonight. Nurse suggests trying tomorrow if pt's condition improves.

## 2021-01-29 NOTE — PROGRESS NOTES
Rosanna Matute 60  INPATIENT OCCUPATIONAL THERAPY  [facility] SURG 8A  EVALUATION    Time In: 9089  Time Out: 1502  Timed Code Treatment Minutes: 15 Minutes  Minutes: 23          Date: 2021  Patient Name: Luis Fernando Mackenzie,   Gender: male      MRN: [id number]  : 1955  (77 y.o.)  Referring Practitioner: JOSE R Almaguer CNP  Diagnosis: COVID-19  Additional Pertinent Hx: Per H&P:66 y.o. male who presented to [address] [place] [address] with chest pain and shortness of breath; has medical history includes diabetes mellitus and he quit smoking 2 to 3 weeks ago; states for the past week he has had intermittent chest tightness along with lightheadedness and dizziness, occasional cough, shortness of breath, nausea and decreased oral intake; also relates to feeling quite fatigued; symptoms continuedso he presented to the emergency department for evaluation; upon initial assessment his O2 sat was 68% so he was placed on high flow with rapid improvement; he was tachypneic initially at 39 with a heart rate of 117 and since has improved to 28; he was found to be Covid positive; he is alert and oriented and speaking in complete sentences without any distress at my time of evaluation; he denies any significant pain; unsure of his Covid contact; denies any loss of taste or smell; he is being admitted to the hospital service for further care and evaluation. Restrictions/Precautions:  Restrictions/Precautions: Fall Risk, Isolation  Position Activity Restriction  Other position/activity restrictions: high flow --flow rate 50 L/min and FiO2 60    Subjective  Chart Reviewed: Yes, Orders  Patient assessed for rehabilitation services?: Yes    Subjective: RN okayed session. Pt was pleasant and agreeable to OT.  Pt was eager to get out of bed for a while    Pain:  Pain Assessment  Patient Currently in Pain: Denies    Social/Functional History:  Lives With: Other (comment)  Type of Home: Apartment Home Layout: One level  Home Access: Stairs to enter with rails  Entrance Stairs - Number of Steps: 3  Entrance Stairs - Rails: Left  Home Equipment: (none)   Bathroom Shower/Tub: Tub only  Bathroom Toilet: Standard       ADL Assistance: Independent  Homemaking Assistance: Independent  Homemaking Responsibilities: Yes  Ambulation Assistance: Independent  Transfer Assistance: Independent    Active : (pt reports had license taken away)     Additional Comments: Patient reports indepenent at [facility] with no AD    VISION:WFL    HEARING:  WFL    COGNITION: WFL    RANGE OF MOTION:  Right Upper Extremity: WFL  Left Upper Extremity:  WFL    STRENGTH:  Right Upper Extremity: WFL  Left Upper Extremity:  WFL    SENSATION:   WFL    ADL:   Grooming: Supervision. for oral care and washing face, application of deodorant while seated on EOB. BALANCE:  Sitting Balance:  Supervision. Standing Balance: Stand By Assistance. Pt stood x3 min while OT replaced chucks pad, good balance noted    BED MOBILITY:  Supine to Sit: Supervision    Scooting: Supervision      TRANSFERS:  Sit to Stand:  Stand By Assistance. from EOB, min VC to watch IV line  Stand to Sit: Stand By Assistance. to EOB    FUNCTIONAL MOBILITY:  Assistive Device: None  Assist Level:  Stand By Assistance. Distance: within lengths of O2 tubing  Pt with good balance, no LOB. Pt completed at slow pace. No dizziness. Min SOB, O2 dropping to 88%. VC for breathing techniques. Exercise:  None    Activity Tolerance:  Patient tolerance of  treatment: good. Patient on 50 L O2 via High Flow  At 50% upon arrival to room. Patient O2 sats at 94 %. Upon activity patient dropping O2 at 88 %. Pt requiring min rest break(s) to recover.          Assessment: Assessment: Pt presented with the listed deficits. Pt with decreased balance, endurance and activity tolerance with ADLs and IADLs. Pt would benefit from continued OT to further increase ease and indep with ADLs and IADLs through strengthening, endurance training, and balance training. Performance deficits / Impairments: Decreased functional mobility , Decreased safe awareness, Decreased balance, Decreased ADL status, Decreased endurance, Decreased strength, Decreased high-level IADLs  Prognosis: Good  REQUIRES OT FOLLOW UP: Yes  Decision Making: Medium Complexity  Safety Devices in place: Yes    Treatment Initiated: Treatment and education initiated within context of evaluation. Evaluation time included review of current medical information, gathering information related to past medical, social and functional history, completion of standardized testing, formal and informal observation of tasks, assessment of data and development of plan of care and goals. Treatment time included skilled education and facilitation of tasks to increase safety and independence with ADL's for improved functional independence and quality of life. Discharge Recommendations:  Continue to assess pending progress    Patient Education:  OT Education: OT Role, Plan of Care, ADL Adaptive Strategies, Transfer Training, Energy Conservation    Equipment Recommendations:  Equipment Needed: No    Plan:  Times per week: 5x  Times per day: Daily  Current Treatment Recommendations: Strengthening, Balance Training, Functional Mobility Training, Endurance Training, Patient/Caregiver Education & Training, Safety Education & Training, Self-Care / ADL, Home Management Training. See long-term goal time frame for expected duration of plan of care. If no long-term goals established, a short length of stay is anticipated.     Goals:  Patient goals : go home  Short term goals  Time Frame for Short term goals: by d/c Short term goal 1: Pt will complete functional mobility to/from BR with SBA, maintaining O2 >90% and no LOB to increase indep with ADL routine  Short term goal 2: Pt will tolerate dynamic standing x4 min with Sup and B hand release to increase indep with groomign  Short term goal 3: Pt will complete various t/fs with Sup and no safety cues to increase indep with toileting  Short term goal 4: Pt will complete BADL routine with no > than SBA to increase indep with bathing  Long term goals  Time Frame for Long term goals : no LTG d/t short ELOS         Following session, patient left in safe position with all fall risk precautions in place.

## 2021-01-30 LAB
ALBUMIN SERPL-MCNC: 2.8 G/DL (ref 3.5–5.1)
ALP BLD-CCNC: 50 U/L (ref 38–126)
ALT SERPL-CCNC: 19 U/L (ref 11–66)
ANION GAP SERPL CALCULATED.3IONS-SCNC: 15 MEQ/L (ref 8–16)
AST SERPL-CCNC: 32 U/L (ref 5–40)
BASOPHILS # BLD: 0.1 %
BASOPHILS ABSOLUTE: 0 THOU/MM3 (ref 0–0.1)
BILIRUB SERPL-MCNC: 0.2 MG/DL (ref 0.3–1.2)
BUN BLDV-MCNC: 72 MG/DL (ref 7–22)
C-REACTIVE PROTEIN: 10.56 MG/DL (ref 0–1)
CALCIUM SERPL-MCNC: 8.4 MG/DL (ref 8.5–10.5)
CHLORIDE BLD-SCNC: 97 MEQ/L (ref 98–111)
CO2: 24 MEQ/L (ref 23–33)
CREAT SERPL-MCNC: 3.4 MG/DL (ref 0.4–1.2)
D-DIMER QUANTITATIVE: 967 NG/ML FEU (ref 0–500)
EOSINOPHIL # BLD: 0 %
EOSINOPHILS ABSOLUTE: 0 THOU/MM3 (ref 0–0.4)
ERYTHROCYTE [DISTWIDTH] IN BLOOD BY AUTOMATED COUNT: 12.2 % (ref 11.5–14.5)
ERYTHROCYTE [DISTWIDTH] IN BLOOD BY AUTOMATED COUNT: 42.5 FL (ref 35–45)
FERRITIN: 540 NG/ML (ref 22–322)
GLUCOSE BLD-MCNC: 152 MG/DL (ref 70–108)
GLUCOSE BLD-MCNC: 162 MG/DL (ref 70–108)
GLUCOSE BLD-MCNC: 169 MG/DL (ref 70–108)
GLUCOSE BLD-MCNC: 171 MG/DL (ref 70–108)
GLUCOSE BLD-MCNC: 224 MG/DL (ref 70–108)
HCT VFR BLD CALC: 47.3 % (ref 42–52)
HEMOGLOBIN: 15.9 GM/DL (ref 14–18)
IMMATURE GRANS (ABS): 0.04 THOU/MM3 (ref 0–0.07)
IMMATURE GRANULOCYTES: 0.6 %
LYMPHOCYTES # BLD: 14.2 %
LYMPHOCYTES ABSOLUTE: 1 THOU/MM3 (ref 1–4.8)
MCH RBC QN AUTO: 31.8 PG (ref 26–33)
MCHC RBC AUTO-ENTMCNC: 33.6 GM/DL (ref 32.2–35.5)
MCV RBC AUTO: 94.6 FL (ref 80–94)
MONOCYTES # BLD: 8.5 %
MONOCYTES ABSOLUTE: 0.6 THOU/MM3 (ref 0.4–1.3)
NUCLEATED RED BLOOD CELLS: 0 /100 WBC
PLATELET # BLD: 309 THOU/MM3 (ref 130–400)
PMV BLD AUTO: 12 FL (ref 9.4–12.4)
POTASSIUM REFLEX MAGNESIUM: 4.8 MEQ/L (ref 3.5–5.2)
PROCALCITONIN: 0.56 NG/ML (ref 0.01–0.09)
RBC # BLD: 5 MILL/MM3 (ref 4.7–6.1)
SEG NEUTROPHILS: 76.6 %
SEGMENTED NEUTROPHILS ABSOLUTE COUNT: 5.5 THOU/MM3 (ref 1.8–7.7)
SODIUM BLD-SCNC: 136 MEQ/L (ref 135–145)
TOTAL PROTEIN: 5.6 G/DL (ref 6.1–8)
WBC # BLD: 7.2 THOU/MM3 (ref 4.8–10.8)

## 2021-01-30 PROCEDURE — 94761 N-INVAS EAR/PLS OXIMETRY MLT: CPT

## 2021-01-30 PROCEDURE — 84145 PROCALCITONIN (PCT): CPT

## 2021-01-30 PROCEDURE — 2580000003 HC RX 258: Performed by: NURSE PRACTITIONER

## 2021-01-30 PROCEDURE — 82948 REAGENT STRIP/BLOOD GLUCOSE: CPT

## 2021-01-30 PROCEDURE — 85379 FIBRIN DEGRADATION QUANT: CPT

## 2021-01-30 PROCEDURE — 2700000000 HC OXYGEN THERAPY PER DAY

## 2021-01-30 PROCEDURE — 80053 COMPREHEN METABOLIC PANEL: CPT

## 2021-01-30 PROCEDURE — 99232 SBSQ HOSP IP/OBS MODERATE 35: CPT | Performed by: NURSE PRACTITIONER

## 2021-01-30 PROCEDURE — 85025 COMPLETE CBC W/AUTO DIFF WBC: CPT

## 2021-01-30 PROCEDURE — 6370000000 HC RX 637 (ALT 250 FOR IP): Performed by: NURSE PRACTITIONER

## 2021-01-30 PROCEDURE — 2060000000 HC ICU INTERMEDIATE R&B

## 2021-01-30 PROCEDURE — 99232 SBSQ HOSP IP/OBS MODERATE 35: CPT | Performed by: INTERNAL MEDICINE

## 2021-01-30 PROCEDURE — 36415 COLL VENOUS BLD VENIPUNCTURE: CPT

## 2021-01-30 PROCEDURE — 86140 C-REACTIVE PROTEIN: CPT

## 2021-01-30 PROCEDURE — 6360000002 HC RX W HCPCS: Performed by: NURSE PRACTITIONER

## 2021-01-30 PROCEDURE — 82728 ASSAY OF FERRITIN: CPT

## 2021-01-30 RX ADMIN — AZITHROMYCIN 250 MG: 250 TABLET, FILM COATED ORAL at 09:05

## 2021-01-30 RX ADMIN — INSULIN LISPRO 2 UNITS: 100 INJECTION, SOLUTION INTRAVENOUS; SUBCUTANEOUS at 12:25

## 2021-01-30 RX ADMIN — Medication 50 MG: at 09:05

## 2021-01-30 RX ADMIN — CEFTRIAXONE SODIUM 1000 MG: 1 INJECTION, POWDER, FOR SOLUTION INTRAMUSCULAR; INTRAVENOUS at 12:23

## 2021-01-30 RX ADMIN — OXYCODONE HYDROCHLORIDE AND ACETAMINOPHEN 500 MG: 500 TABLET ORAL at 09:05

## 2021-01-30 RX ADMIN — INSULIN LISPRO 2 UNITS: 100 INJECTION, SOLUTION INTRAVENOUS; SUBCUTANEOUS at 17:16

## 2021-01-30 RX ADMIN — HEPARIN SODIUM 5000 UNITS: 5000 INJECTION INTRAVENOUS; SUBCUTANEOUS at 10:35

## 2021-01-30 RX ADMIN — SODIUM CHLORIDE, PRESERVATIVE FREE 10 ML: 5 INJECTION INTRAVENOUS at 09:10

## 2021-01-30 RX ADMIN — INSULIN LISPRO 2 UNITS: 100 INJECTION, SOLUTION INTRAVENOUS; SUBCUTANEOUS at 09:05

## 2021-01-30 RX ADMIN — HEPARIN SODIUM 5000 UNITS: 5000 INJECTION INTRAVENOUS; SUBCUTANEOUS at 06:05

## 2021-01-30 RX ADMIN — DEXAMETHASONE 6 MG: 4 TABLET ORAL at 09:05

## 2021-01-30 RX ADMIN — HEPARIN SODIUM 5000 UNITS: 5000 INJECTION INTRAVENOUS; SUBCUTANEOUS at 17:18

## 2021-01-30 RX ADMIN — Medication 2000 UNITS: at 09:05

## 2021-01-30 ASSESSMENT — PAIN SCALES - GENERAL: PAINLEVEL_OUTOF10: 0

## 2021-01-30 NOTE — PROGRESS NOTES
Hospitalist Progress Note    Patient:  Tejas Paige      Unit/Bed:8A-11/011-A    YOB: 1955    MRN: [id number]       Acct: [de-identified]     PCP: Prem Alcala MD    Date of Admission: 1/28/2021    Assessment/Plan:    1. COVID-19 infection--positive test on 1/28; Decadron 1/28, remdesivir 1/28 x 1 dose and holding secondary to #4, convalescent plasma on 1/28, 1/29, vitamin C, vitamin D and zinc, incentive spirometry and Acapella, PT and OT; ferritin at 540; D dimer at 967  2. Bilateral pneumonia (POA) likely secondary to COVID-19, possibly bacterial component--see #1; procalcitonin decreasing; Zithromax 1/28,  Rocephin 1/29  3. Sepsis (POA) likely secondary to #1, #2--afebrile; much improved, monitor  4. Acute hypoxic respiratory failure--had an initial O2 sat of 68%; on high flow and is currently at 65% FiO2 and 60 L satting 92%; wean as able,  incentive spirometry and Acapella  5. JERMAINE--likely prerenal secondary to decreased p.o. intake; 0.9 normal saline at 50 mL's an hour; appreciate nephrology input as creatinine is improving; was on lisinopril at home however that was not reordered here; avoid hypotension and nephrotoxic agents  6. Polycythemia, likely secondary to dehydration--see #5;  improved  7. Sinus tachycardia--resolved; continue telemetry  8. Diabetes mellitus type 2, uncontrolled--holding p.o. hypoglycemics; on sliding scale level 2 along with hypoglycemia protocol; monitor  9. Essential hypertension, controlled--takes lisinopril at home however that is on hold; blood pressure is stable  10. Remote smoker quitting 2 to 3 weeks ago  11. Cocaine abuse--checked urine drug screen and positive; history of same May 2, 2020     Expected discharge date:  To be determined    Disposition:    [x] Home       [] TCU       [] Rehab       [] Psych       [] SNF       [] [facility]       [] Other-    Chief Complaint: Chest pain and shortness of breath Hospital Course:  77 y.o. male who presented to [address] [place] [address] with chest pain and shortness of breath; has medical history includes diabetes mellitus and he quit smoking 2 to 3 weeks ago; states for the past week he has had intermittent chest tightness along with lightheadedness and dizziness, occasional cough, shortness of breath, nausea and decreased oral intake; also relates to feeling quite fatigued; symptoms continuedso he presented to the emergency department for evaluation; upon initial assessment his O2 sat was 68% so he was placed on high flow with rapid improvement; he was tachypneic initially at 39 with a heart rate of 117 and since has improved to 28; he was found to be Covid positive; he is alert and oriented and speaking in complete sentences without any distress at my time of evaluation; he denies any significant pain; unsure of his Covid contact; denies any loss of taste or smell; he is being admitted to the hospital service for further care and evaluation.     1/29--> currently on high flow at 60% FiO2 and 50 L and satting 95%; creatinine increased to 4.1 from 3.1 so nephrology was consulted and appreciate their input; urine drug screen positive for cocaine    1/30--> on high flow oxygen at 65% FiO2 and 60 L; afebrile hemodynamically stable    Subjective (past 24 hours): States overall he is feeling much better, feels his cough is worse in the morning but just bringing up some clear phlegm    Medications:  Reviewed    Infusion Medications    sodium chloride 50 mL/hr at 01/29/21 0802    sodium chloride      sodium chloride      dextrose       Scheduled Medications    cefTRIAXone (ROCEPHIN) IV  1,000 mg Intravenous Q24H    sodium chloride flush  10 mL Intravenous 2 times per day    dexamethasone  6 mg Oral Daily    Vitamin D  2,000 Units Oral Daily    ascorbic acid  500 mg Oral Daily    zinc sulfate  50 mg Oral Daily    insulin lispro  0-12 Units Subcutaneous TID WC  insulin lispro  0-6 Units Subcutaneous Nightly    heparin (porcine)  5,000 Units Subcutaneous Q8H    azithromycin  250 mg Oral Daily     PRN Meds: sodium chloride, sodium chloride flush, promethazine **OR** ondansetron, polyethylene glycol, acetaminophen **OR** acetaminophen, guaiFENesin-dextromethorphan, sodium chloride, glucose, dextrose, glucagon (rDNA), dextrose, albuterol sulfate HFA, sodium chloride      Intake/Output Summary (Last 24 hours) at 1/30/2021 1044  Last data filed at 1/30/2021 0331  Gross per 24 hour   Intake 1956.44 ml   Output 950 ml   Net 1006.44 ml       Diet:  DIET CARB CONTROL; Exam:  /88   Pulse 88   Temp 97.9 °F (36.6 °C) (Oral)   Resp 18   Ht 6' 2\" (1.88 m)   Wt 220 lb (99.8 kg)   SpO2 94%   BMI 28.25 kg/m²     General appearance: No apparent distress, appears stated age and cooperative. HEENT: Pupils equal, round, and reactive to light. Conjunctivae/corneas clear. Neck: Supple, with full range of motion. No jugular venous distention. Trachea midline. Respiratory:  Normal respiratory effort. Clear to auscultation, bilaterally; able to speak in complete sentences and not tachypneic  Cardiovascular: Regular rate and rhythm with normal S1/S2 without murmurs, rubs or gallops. Abdomen: Soft, non-tender, non-distended with normal bowel sounds. Musculoskeletal: passive and active ROM x 4 extremities. Skin: Skin color, texture, turgor normal.    Neurologic:  Neurovascularly intact without any focal sensory/motor deficits.  Cranial nerves: II-XII intact, grossly non-focal.  Psychiatric: Alert and oriented x 4, thought content appropriate  Capillary Refill: Brisk,< 3 seconds   Peripheral Pulses: +2 palpable, equal bilaterally       Labs:   Recent Labs     01/28/21  0930 01/29/21  0541 01/30/21  0535   WBC 8.1 5.6 7.2   HGB 19.1* 16.4 15.9   HCT 56.2* 49.3 47.3    256 309     Recent Labs     01/28/21  0930 01/29/21  0541 01/30/21  0535    135 136 K 4.4 4.1 4.8   CL 92* 93* 97*   CO2 30 28 24   BUN 45* 58* 72*   CREATININE 3.1* 4.1* 3.4*   CALCIUM 9.1 8.3* 8.4*     Recent Labs     01/28/21  0930 01/29/21  0541 01/30/21  0535   AST 52* 38 32   ALT 19 17 19   BILITOT 0.9 0.4 0.2*   ALKPHOS 60 48 50     No results for input(s): INR in the last 72 hours. No results for input(s): [employer] in the last 72 hours. Recent Labs     01/28/21  0930 01/29/21  0541 01/30/21  0535   PROCAL 0.39* 0.70* 0.56*       Microbiology:    [id number] positive on 1/28    Radiology:  Xr Chest Portable    Result Date: 1/28/2021  PROCEDURE: XR CHEST PORTABLE CLINICAL INFORMATION: hypoxic, covid COMPARISON: 5/2/2020 TECHNIQUE:  AP mobile chest single view  FINDINGS: There is mild to moderate enlargement of the cardiomediastinal silhouette. Bilateral interstitial opacities are present which may be related to edema. However there is more focal patchy consolidative airspace disease at the bilateral right greater than left lung bases which may represent underlying pneumonia. No pneumothorax or  pleural effusion is seen. No acute osseous findings are demonstrated. 1. Bilateral diffuse interstitial opacities are present which may represent edema and/or pneumonia. However there are more focal consolidative opacities at the right greater than left lung bases which likely represents underlying pneumonia. Recommend continued follow-up. 2. Mild to moderate enlargement of the cardiomediastinal silhouette is noted. **This report has been created using voice recognition software. It may contain minor errors which are inherent in voice recognition technology. ** Final report electronically signed by Dr. Rubia Roldan on 1/28/2021 10:30 AM      DVT prophylaxis: [] Lovenox                                 [] SCDs                                 [x] SQ Heparin                                 [] Encourage ambulation           [] Already on Anticoagulation     Code Status: Full Code Tele:   [x] yes sinus rhythm heart rate 83             [] no    Active Hospital Problems    Diagnosis Date Noted    COVID-19 virus detected [U07.1] 01/28/2021       Electronically signed by JOSE R Hinson CNP on 1/30/2021 at 10:44 AM

## 2021-01-30 NOTE — PROGRESS NOTES
[facility]         Renal Inpatient Follow-Up note         1/30/2021 11:20 AM    Pt Name:   Kady Medina  YOB: 1955  Attending:   JOSE R Flores *    Chief Complaint : Kady Medina is a 77 y.o. male being followed by nephrology for acute kidney injury    Interval History :   Patient seen and examined by me. No distress  Feels well denies any chest pain or shortness of breath  He is still requiring high flow oxygen     Scheduled Medications :    cefTRIAXone (ROCEPHIN) IV  1,000 mg Intravenous Q24H    sodium chloride flush  10 mL Intravenous 2 times per day    dexamethasone  6 mg Oral Daily    Vitamin D  2,000 Units Oral Daily    ascorbic acid  500 mg Oral Daily    zinc sulfate  50 mg Oral Daily    insulin lispro  0-12 Units Subcutaneous TID WC    insulin lispro  0-6 Units Subcutaneous Nightly    heparin (porcine)  5,000 Units Subcutaneous Q8H    azithromycin  250 mg Oral Daily      sodium chloride 50 mL/hr at 01/29/21 0802    sodium chloride      sodium chloride      dextrose         Vitals :  /88   Pulse 88   Temp 97.9 °F (36.6 °C) (Oral)   Resp 18   Ht 6' 2\" (1.88 m)   Wt 220 lb (99.8 kg)   SpO2 94%   BMI 28.25 kg/m²     24HR INTAKE/OUTPUT:      Intake/Output Summary (Last 24 hours) at 1/30/2021 1120  Last data filed at 1/30/2021 0331  Gross per 24 hour   Intake 1956.44 ml   Output 950 ml   Net 1006.44 ml     Last 3 weights  Wt Readings from Last 3 Encounters:   01/28/21 220 lb (99.8 kg)   05/03/20 228 lb 4.8 oz (103.6 kg)   10/02/18 205 lb (93 kg)           Physical Exam :  General Appearance:  Well developed.  No distress  Mouth/Throat:  Oral mucosa moist  Neck:  Supple, no JVD  Lungs:  Breath sounds: clear  Heart[de-identified]  S1,S2 heard  Abdomen:  Soft, non - tender  Musculoskeletal:  Edema -no edema         Last 3 CBC   Recent Labs     01/28/21  0930 01/29/21  0541 01/30/21  0535   WBC 8.1 5.6 7.2   RBC 5.98 5.19 5.00 HGB 19.1* 16.4 15.9   HCT 56.2* 49.3 47.3    256 309     Last 3 CMP  Recent Labs     01/28/21  0930 01/29/21  0541 01/30/21  0535    135 136   K 4.4 4.1 4.8   CL 92* 93* 97*   CO2 30 28 24   BUN 45* 58* 72*   CREATININE 3.1* 4.1* 3.4*   CALCIUM 9.1 8.3* 8.4*   LABALBU 2.9* 2.4* 2.8*   BILITOT 0.9 0.4 0.2*             Assessment    1. Renal -acute kidney injury, most likely secondary to multifactorial etiology primarily due to the extensive use of nonsteroidal anti-inflammatories combined with ACE inhibitor. May have some component of prerenal as well. ? Does not look overtly congested at this time continue IV fluids  ? Urine lites show ATN picture urine eosinophils are negative, renal ultrasound scan normal  ? Renal function improving with hydration will continue for now     2. Electrolytes -appear to be within normal limits  3. Acid-base status-appears to be stable  4. Essential hypertension running well  5. COVID-19 pneumonia  6. Hx of diabetes mellitus  7. Meds reviewed and discussed with patient       HILLARY Partida D.  [facility].

## 2021-01-31 LAB
ALBUMIN SERPL-MCNC: 2.6 G/DL (ref 3.5–5.1)
ALP BLD-CCNC: 50 U/L (ref 38–126)
ALT SERPL-CCNC: 23 U/L (ref 11–66)
ANION GAP SERPL CALCULATED.3IONS-SCNC: 13 MEQ/L (ref 8–16)
AST SERPL-CCNC: 31 U/L (ref 5–40)
BASOPHILS # BLD: 0.3 %
BASOPHILS ABSOLUTE: 0 THOU/MM3 (ref 0–0.1)
BILIRUB SERPL-MCNC: 0.2 MG/DL (ref 0.3–1.2)
BUN BLDV-MCNC: 67 MG/DL (ref 7–22)
C-REACTIVE PROTEIN: 5.79 MG/DL (ref 0–1)
CALCIUM SERPL-MCNC: 8.5 MG/DL (ref 8.5–10.5)
CHLORIDE BLD-SCNC: 98 MEQ/L (ref 98–111)
CO2: 26 MEQ/L (ref 23–33)
CREAT SERPL-MCNC: 2.7 MG/DL (ref 0.4–1.2)
D-DIMER QUANTITATIVE: 1166 NG/ML FEU (ref 0–500)
EOSINOPHIL # BLD: 0 %
EOSINOPHILS ABSOLUTE: 0 THOU/MM3 (ref 0–0.4)
ERYTHROCYTE [DISTWIDTH] IN BLOOD BY AUTOMATED COUNT: 12.1 % (ref 11.5–14.5)
ERYTHROCYTE [DISTWIDTH] IN BLOOD BY AUTOMATED COUNT: 42.8 FL (ref 35–45)
GLUCOSE BLD-MCNC: 114 MG/DL (ref 70–108)
GLUCOSE BLD-MCNC: 121 MG/DL (ref 70–108)
GLUCOSE BLD-MCNC: 149 MG/DL (ref 70–108)
GLUCOSE BLD-MCNC: 236 MG/DL (ref 70–108)
GLUCOSE BLD-MCNC: 238 MG/DL (ref 70–108)
GLUCOSE BLD-MCNC: 258 MG/DL (ref 70–108)
HCT VFR BLD CALC: 48.1 % (ref 42–52)
HEMOGLOBIN: 16 GM/DL (ref 14–18)
IMMATURE GRANS (ABS): 0.03 THOU/MM3 (ref 0–0.07)
IMMATURE GRANULOCYTES: 0.4 %
LYMPHOCYTES # BLD: 14.1 %
LYMPHOCYTES ABSOLUTE: 1.1 THOU/MM3 (ref 1–4.8)
MCH RBC QN AUTO: 31.4 PG (ref 26–33)
MCHC RBC AUTO-ENTMCNC: 33.3 GM/DL (ref 32.2–35.5)
MCV RBC AUTO: 94.5 FL (ref 80–94)
MONOCYTES # BLD: 9.3 %
MONOCYTES ABSOLUTE: 0.7 THOU/MM3 (ref 0.4–1.3)
NUCLEATED RED BLOOD CELLS: 0 /100 WBC
PLATELET # BLD: 353 THOU/MM3 (ref 130–400)
PMV BLD AUTO: 12 FL (ref 9.4–12.4)
POTASSIUM REFLEX MAGNESIUM: 4.4 MEQ/L (ref 3.5–5.2)
POTASSIUM SERPL-SCNC: 4.4 MEQ/L (ref 3.5–5.2)
RBC # BLD: 5.09 MILL/MM3 (ref 4.7–6.1)
SEG NEUTROPHILS: 75.9 %
SEGMENTED NEUTROPHILS ABSOLUTE COUNT: 5.8 THOU/MM3 (ref 1.8–7.7)
SODIUM BLD-SCNC: 137 MEQ/L (ref 135–145)
TOTAL PROTEIN: 6.4 G/DL (ref 6.1–8)
WBC # BLD: 7.6 THOU/MM3 (ref 4.8–10.8)

## 2021-01-31 PROCEDURE — 2580000003 HC RX 258: Performed by: NURSE PRACTITIONER

## 2021-01-31 PROCEDURE — 6370000000 HC RX 637 (ALT 250 FOR IP): Performed by: NURSE PRACTITIONER

## 2021-01-31 PROCEDURE — 2060000000 HC ICU INTERMEDIATE R&B

## 2021-01-31 PROCEDURE — 99232 SBSQ HOSP IP/OBS MODERATE 35: CPT | Performed by: NURSE PRACTITIONER

## 2021-01-31 PROCEDURE — 6360000002 HC RX W HCPCS: Performed by: NURSE PRACTITIONER

## 2021-01-31 PROCEDURE — 85379 FIBRIN DEGRADATION QUANT: CPT

## 2021-01-31 PROCEDURE — 82948 REAGENT STRIP/BLOOD GLUCOSE: CPT

## 2021-01-31 PROCEDURE — 36415 COLL VENOUS BLD VENIPUNCTURE: CPT

## 2021-01-31 PROCEDURE — 94669 MECHANICAL CHEST WALL OSCILL: CPT

## 2021-01-31 PROCEDURE — 94761 N-INVAS EAR/PLS OXIMETRY MLT: CPT

## 2021-01-31 PROCEDURE — 99232 SBSQ HOSP IP/OBS MODERATE 35: CPT | Performed by: INTERNAL MEDICINE

## 2021-01-31 PROCEDURE — 85025 COMPLETE CBC W/AUTO DIFF WBC: CPT

## 2021-01-31 PROCEDURE — 86140 C-REACTIVE PROTEIN: CPT

## 2021-01-31 PROCEDURE — 80053 COMPREHEN METABOLIC PANEL: CPT

## 2021-01-31 PROCEDURE — 2700000000 HC OXYGEN THERAPY PER DAY

## 2021-01-31 RX ADMIN — HEPARIN SODIUM 5000 UNITS: 5000 INJECTION INTRAVENOUS; SUBCUTANEOUS at 02:44

## 2021-01-31 RX ADMIN — OXYCODONE HYDROCHLORIDE AND ACETAMINOPHEN 500 MG: 500 TABLET ORAL at 09:08

## 2021-01-31 RX ADMIN — INSULIN LISPRO 2 UNITS: 100 INJECTION, SOLUTION INTRAVENOUS; SUBCUTANEOUS at 12:19

## 2021-01-31 RX ADMIN — INSULIN LISPRO 4 UNITS: 100 INJECTION, SOLUTION INTRAVENOUS; SUBCUTANEOUS at 17:00

## 2021-01-31 RX ADMIN — HEPARIN SODIUM 5000 UNITS: 5000 INJECTION INTRAVENOUS; SUBCUTANEOUS at 17:00

## 2021-01-31 RX ADMIN — DEXAMETHASONE 6 MG: 4 TABLET ORAL at 09:08

## 2021-01-31 RX ADMIN — AZITHROMYCIN 250 MG: 250 TABLET, FILM COATED ORAL at 09:08

## 2021-01-31 RX ADMIN — HEPARIN SODIUM 5000 UNITS: 5000 INJECTION INTRAVENOUS; SUBCUTANEOUS at 09:42

## 2021-01-31 RX ADMIN — Medication 50 MG: at 09:07

## 2021-01-31 RX ADMIN — CEFTRIAXONE SODIUM 1000 MG: 1 INJECTION, POWDER, FOR SOLUTION INTRAMUSCULAR; INTRAVENOUS at 12:45

## 2021-01-31 RX ADMIN — Medication 2000 UNITS: at 09:08

## 2021-01-31 RX ADMIN — SODIUM CHLORIDE: 9 INJECTION, SOLUTION INTRAVENOUS at 02:54

## 2021-01-31 ASSESSMENT — PAIN SCALES - GENERAL
PAINLEVEL_OUTOF10: 0

## 2021-01-31 NOTE — PROGRESS NOTES
[facility]         Renal Inpatient Follow-Up note         1/31/2021 12:50 PM    Pt Name:   Sunil Stout  YOB: 1955  Attending:   JOSE R Lindsay *    Chief Complaint : Sunil Stout is a 77 y.o. male being followed by nephrology for acute kidney injury    Interval History :   Patient seen and examined by me. No distress  Feels well denies any chest pain or shortness of breath  He is still requiring high flow oxygen,  No other complaints     Scheduled Medications :    cefTRIAXone (ROCEPHIN) IV  1,000 mg Intravenous Q24H    sodium chloride flush  10 mL Intravenous 2 times per day    dexamethasone  6 mg Oral Daily    Vitamin D  2,000 Units Oral Daily    ascorbic acid  500 mg Oral Daily    zinc sulfate  50 mg Oral Daily    insulin lispro  0-12 Units Subcutaneous TID WC    insulin lispro  0-6 Units Subcutaneous Nightly    heparin (porcine)  5,000 Units Subcutaneous Q8H    azithromycin  250 mg Oral Daily      sodium chloride 50 mL/hr at 01/31/21 0254    sodium chloride      sodium chloride      dextrose         Vitals :  /82   Pulse 89   Temp 98.6 °F (37 °C) (Oral)   Resp 20   Ht 6' 2\" (1.88 m)   Wt 220 lb (99.8 kg)   SpO2 95%   BMI 28.25 kg/m²     24HR INTAKE/OUTPUT:      Intake/Output Summary (Last 24 hours) at 1/31/2021 1250  Last data filed at 1/31/2021 0919  Gross per 24 hour   Intake 1396.8 ml   Output 2075 ml   Net -678.2 ml     Last 3 weights  Wt Readings from Last 3 Encounters:   01/28/21 220 lb (99.8 kg)   05/03/20 228 lb 4.8 oz (103.6 kg)   10/02/18 205 lb (93 kg)           Physical Exam :  General Appearance:  Well developed.  No distress  Mouth/Throat:  Oral mucosa moist  Neck:  Supple, no JVD  Lungs:  Breath sounds: clear  Heart[de-identified]  S1,S2 heard  Abdomen:  Soft, non - tender  Musculoskeletal:  Edema -no edema         Last 3 CBC   Recent Labs     01/29/21  0541 01/30/21  0535 01/31/21  0538   WBC 5.6 7.2 7.6 RBC 5.19 5.00 5.09   HGB 16.4 15.9 16.0   HCT 49.3 47.3 48.1    309 353     Last 3 CMP  Recent Labs     01/29/21  0541 01/30/21  0535 01/31/21  0538    136 137   K 4.1 4.8 4.4  4.4   CL 93* 97* 98   CO2 28 24 26   BUN 58* 72* 67*   CREATININE 4.1* 3.4* 2.7*   CALCIUM 8.3* 8.4* 8.5   LABALBU 2.4* 2.8* 2.6*   BILITOT 0.4 0.2* 0.2*             Assessment    1. Renal -acute kidney injury, most likely secondary to multifactorial etiology primarily due to the extensive use of nonsteroidal anti-inflammatories combined with ACE inhibitor. May have some component of prerenal as well. ? Does not look overtly congested at this time continue IV fluids  ? Urine lites show ATN picture urine eosinophils are negative, renal ultrasound scan normal  ? Renal function improving with hydration will continue for now     2. Electrolytes -appear to be within normal limits  3. Acid-base status-appears to be stable  4. Essential hypertension running well  5. COVID-19 pneumonia  6. Hx of diabetes mellitus  7. Meds reviewed and discussed with patient       HILLARY Gaffney D.  [facility].

## 2021-01-31 NOTE — PROGRESS NOTES
Chief Complaint: Chest pain and shortness of breath    Hospital Course:  77 y.o. male who presented to [address] [place] [address] with chest pain and shortness of breath; has medical history includes diabetes mellitus and he quit smoking 2 to 3 weeks ago; states for the past week he has had intermittent chest tightness along with lightheadedness and dizziness, occasional cough, shortness of breath, nausea and decreased oral intake; also relates to feeling quite fatigued; symptoms continuedso he presented to the emergency department for evaluation; upon initial assessment his O2 sat was 68% so he was placed on high flow with rapid improvement; he was tachypneic initially at 39 with a heart rate of 117 and since has improved to 28; he was found to be Covid positive; he is alert and oriented and speaking in complete sentences without any distress at my time of evaluation; he denies any significant pain; unsure of his Covid contact; denies any loss of taste or smell; he is being admitted to the hospital service for further care and evaluation.     1/29--> currently on high flow at 60% FiO2 and 50 L and satting 95%; creatinine increased to 4.1 from 3.1 so nephrology was consulted and appreciate their input; urine drug screen positive for cocaine    1/30--> on high flow oxygen at 65% FiO2 and 60 L; afebrile hemodynamically stable    1/31--> on high flow oxygen at 55% and 60 L and currently satting 92%; offers no complaints and states he is feeling better    Subjective (past 24 hours): States overall he is feeling better, still with cough and worse in the morning but just bringing up some clear phlegm    Medications:  Reviewed    Infusion Medications    sodium chloride 50 mL/hr at 01/31/21 0254    sodium chloride      sodium chloride      dextrose       Scheduled Medications    cefTRIAXone (ROCEPHIN) IV  1,000 mg Intravenous Q24H    sodium chloride flush  10 mL Intravenous 2 times per day  dexamethasone  6 mg Oral Daily    Vitamin D  2,000 Units Oral Daily    ascorbic acid  500 mg Oral Daily    zinc sulfate  50 mg Oral Daily    insulin lispro  0-12 Units Subcutaneous TID WC    insulin lispro  0-6 Units Subcutaneous Nightly    heparin (porcine)  5,000 Units Subcutaneous Q8H    azithromycin  250 mg Oral Daily     PRN Meds: sodium chloride, sodium chloride flush, promethazine **OR** ondansetron, polyethylene glycol, acetaminophen **OR** acetaminophen, guaiFENesin-dextromethorphan, sodium chloride, glucose, dextrose, glucagon (rDNA), dextrose, albuterol sulfate HFA, sodium chloride      Intake/Output Summary (Last 24 hours) at 1/31/2021 1118  Last data filed at 1/31/2021 0919  Gross per 24 hour   Intake 1396.8 ml   Output 2075 ml   Net -678.2 ml       Diet:  DIET CARB CONTROL; Exam:  BP (!) 143/92   Pulse 86   Temp 97.2 °F (36.2 °C) (Oral)   Resp 20   Ht 6' 2\" (1.88 m)   Wt 220 lb (99.8 kg)   SpO2 92% Comment: 91-92%  BMI 28.25 kg/m²     General appearance: No apparent distress, appears stated age and cooperative. HEENT: Pupils equal, round, and reactive to light. Conjunctivae/corneas clear. Neck: Supple, with full range of motion. No jugular venous distention. Trachea midline. Respiratory:  Normal respiratory effort. Rhonchi to auscultation, bilaterally; able to speak in complete sentences and not tachypneic  Cardiovascular: Regular rate and rhythm with normal S1/S2 without murmurs, rubs or gallops. Abdomen: Soft, non-tender, non-distended with normal bowel sounds. Musculoskeletal: passive and active ROM x 4 extremities. Skin: Skin color, texture, turgor normal.    Neurologic:  Neurovascularly intact without any focal sensory/motor deficits.  Cranial nerves: II-XII intact, grossly non-focal.  Psychiatric: Alert and oriented x 4, thought content appropriate  Capillary Refill: Brisk,< 3 seconds   Peripheral Pulses: +2 palpable, equal bilaterally       Labs:   Recent Labs 01/29/21 0541 01/30/21 0535 01/31/21  0538   WBC 5.6 7.2 7.6   HGB 16.4 15.9 16.0   HCT 49.3 47.3 48.1    309 353     Recent Labs     01/29/21 0541 01/30/21  0535 01/31/21  0538    136 137   K 4.1 4.8 4.4  4.4   CL 93* 97* 98   CO2 28 24 26   BUN 58* 72* 67*   CREATININE 4.1* 3.4* 2.7*   CALCIUM 8.3* 8.4* 8.5     Recent Labs     01/29/21 0541 01/30/21  0535 01/31/21  0538   AST 38 32 31   ALT 17 19 23   BILITOT 0.4 0.2* 0.2*   ALKPHOS 48 50 50     No results for input(s): INR in the last 72 hours. No results for input(s): Asencio Adam in the last 72 hours. Recent Labs     01/29/21 0541 01/30/21 0535   PROCAL 0.70* 0.56*       Microbiology:    [id number] positive on 1/28    Radiology:  Xr Chest Portable    Result Date: 1/28/2021  PROCEDURE: XR CHEST PORTABLE CLINICAL INFORMATION: hypoxic, covid COMPARISON: 5/2/2020 TECHNIQUE:  AP mobile chest single view  FINDINGS: There is mild to moderate enlargement of the cardiomediastinal silhouette. Bilateral interstitial opacities are present which may be related to edema. However there is more focal patchy consolidative airspace disease at the bilateral right greater than left lung bases which may represent underlying pneumonia. No pneumothorax or  pleural effusion is seen. No acute osseous findings are demonstrated. 1. Bilateral diffuse interstitial opacities are present which may represent edema and/or pneumonia. However there are more focal consolidative opacities at the right greater than left lung bases which likely represents underlying pneumonia. Recommend continued follow-up. 2. Mild to moderate enlargement of the cardiomediastinal silhouette is noted. **This report has been created using voice recognition software. It may contain minor errors which are inherent in voice recognition technology. ** Final report electronically signed by Dr. Maksim Tineo on 1/28/2021 10:30 AM      DVT prophylaxis: [] Lovenox [] SCDs                                 [x] SQ Heparin                                 [] Encourage ambulation           [] Already on Anticoagulation     Code Status: Full Code    Tele:   [x] yes sinus rhythm heart rate 95             [] no    Active Hospital Problems    Diagnosis Date Noted    COVID-19 virus detected [U07.1] 01/28/2021       Electronically signed by JOSE R Velasquez CNP on 1/31/2021 at 11:18 AM

## 2021-02-01 LAB
ALBUMIN SERPL-MCNC: 2.6 G/DL (ref 3.5–5.1)
ALP BLD-CCNC: 48 U/L (ref 38–126)
ALT SERPL-CCNC: 30 U/L (ref 11–66)
ANION GAP SERPL CALCULATED.3IONS-SCNC: 7 MEQ/L (ref 8–16)
AST SERPL-CCNC: 29 U/L (ref 5–40)
BASOPHILS # BLD: 0.3 %
BASOPHILS ABSOLUTE: 0 THOU/MM3 (ref 0–0.1)
BILIRUB SERPL-MCNC: 0.3 MG/DL (ref 0.3–1.2)
BUN BLDV-MCNC: 53 MG/DL (ref 7–22)
CALCIUM SERPL-MCNC: 8.5 MG/DL (ref 8.5–10.5)
CHLORIDE BLD-SCNC: 102 MEQ/L (ref 98–111)
CO2: 28 MEQ/L (ref 23–33)
CREAT SERPL-MCNC: 2.1 MG/DL (ref 0.4–1.2)
D-DIMER QUANTITATIVE: 1475 NG/ML FEU (ref 0–500)
EOSINOPHIL # BLD: 0.1 %
EOSINOPHILS ABSOLUTE: 0 THOU/MM3 (ref 0–0.4)
ERYTHROCYTE [DISTWIDTH] IN BLOOD BY AUTOMATED COUNT: 12.2 % (ref 11.5–14.5)
ERYTHROCYTE [DISTWIDTH] IN BLOOD BY AUTOMATED COUNT: 42.9 FL (ref 35–45)
FERRITIN: 389 NG/ML (ref 22–322)
GLUCOSE BLD-MCNC: 101 MG/DL (ref 70–108)
GLUCOSE BLD-MCNC: 114 MG/DL (ref 70–108)
GLUCOSE BLD-MCNC: 153 MG/DL (ref 70–108)
GLUCOSE BLD-MCNC: 209 MG/DL (ref 70–108)
GLUCOSE BLD-MCNC: 229 MG/DL (ref 70–108)
HCT VFR BLD CALC: 49.4 % (ref 42–52)
HEMOGLOBIN: 16.2 GM/DL (ref 14–18)
IMMATURE GRANS (ABS): 0.02 THOU/MM3 (ref 0–0.07)
IMMATURE GRANULOCYTES: 0.3 %
LYMPHOCYTES # BLD: 13.9 %
LYMPHOCYTES ABSOLUTE: 0.9 THOU/MM3 (ref 1–4.8)
MCH RBC QN AUTO: 31.2 PG (ref 26–33)
MCHC RBC AUTO-ENTMCNC: 32.8 GM/DL (ref 32.2–35.5)
MCV RBC AUTO: 95 FL (ref 80–94)
MONOCYTES # BLD: 10.8 %
MONOCYTES ABSOLUTE: 0.7 THOU/MM3 (ref 0.4–1.3)
NUCLEATED RED BLOOD CELLS: 0 /100 WBC
PLATELET # BLD: 383 THOU/MM3 (ref 130–400)
PMV BLD AUTO: 11.9 FL (ref 9.4–12.4)
POTASSIUM REFLEX MAGNESIUM: 4.3 MEQ/L (ref 3.5–5.2)
POTASSIUM SERPL-SCNC: 4.3 MEQ/L (ref 3.5–5.2)
PROCALCITONIN: 0.18 NG/ML (ref 0.01–0.09)
RBC # BLD: 5.2 MILL/MM3 (ref 4.7–6.1)
SEG NEUTROPHILS: 74.6 %
SEGMENTED NEUTROPHILS ABSOLUTE COUNT: 5.1 THOU/MM3 (ref 1.8–7.7)
SODIUM BLD-SCNC: 137 MEQ/L (ref 135–145)
TOTAL PROTEIN: 6.4 G/DL (ref 6.1–8)
WBC # BLD: 6.8 THOU/MM3 (ref 4.8–10.8)

## 2021-02-01 PROCEDURE — 6360000002 HC RX W HCPCS: Performed by: NURSE PRACTITIONER

## 2021-02-01 PROCEDURE — 97535 SELF CARE MNGMENT TRAINING: CPT

## 2021-02-01 PROCEDURE — 82728 ASSAY OF FERRITIN: CPT

## 2021-02-01 PROCEDURE — 82948 REAGENT STRIP/BLOOD GLUCOSE: CPT

## 2021-02-01 PROCEDURE — 99232 SBSQ HOSP IP/OBS MODERATE 35: CPT | Performed by: INTERNAL MEDICINE

## 2021-02-01 PROCEDURE — 6370000000 HC RX 637 (ALT 250 FOR IP): Performed by: PHYSICIAN ASSISTANT

## 2021-02-01 PROCEDURE — 2700000000 HC OXYGEN THERAPY PER DAY

## 2021-02-01 PROCEDURE — 80053 COMPREHEN METABOLIC PANEL: CPT

## 2021-02-01 PROCEDURE — 6370000000 HC RX 637 (ALT 250 FOR IP): Performed by: INTERNAL MEDICINE

## 2021-02-01 PROCEDURE — 6370000000 HC RX 637 (ALT 250 FOR IP): Performed by: NURSE PRACTITIONER

## 2021-02-01 PROCEDURE — 2060000000 HC ICU INTERMEDIATE R&B

## 2021-02-01 PROCEDURE — 94761 N-INVAS EAR/PLS OXIMETRY MLT: CPT

## 2021-02-01 PROCEDURE — 2580000003 HC RX 258: Performed by: NURSE PRACTITIONER

## 2021-02-01 PROCEDURE — 84145 PROCALCITONIN (PCT): CPT

## 2021-02-01 PROCEDURE — 97116 GAIT TRAINING THERAPY: CPT

## 2021-02-01 PROCEDURE — 97110 THERAPEUTIC EXERCISES: CPT

## 2021-02-01 PROCEDURE — 99232 SBSQ HOSP IP/OBS MODERATE 35: CPT | Performed by: NURSE PRACTITIONER

## 2021-02-01 PROCEDURE — 85025 COMPLETE CBC W/AUTO DIFF WBC: CPT

## 2021-02-01 PROCEDURE — 85379 FIBRIN DEGRADATION QUANT: CPT

## 2021-02-01 PROCEDURE — 36415 COLL VENOUS BLD VENIPUNCTURE: CPT

## 2021-02-01 RX ORDER — AMLODIPINE BESYLATE 5 MG/1
5 TABLET ORAL DAILY
Status: DISCONTINUED | OUTPATIENT
Start: 2021-02-01 | End: 2021-02-03

## 2021-02-01 RX ORDER — HYDRALAZINE HYDROCHLORIDE 25 MG/1
25 TABLET, FILM COATED ORAL EVERY 8 HOURS PRN
Status: DISCONTINUED | OUTPATIENT
Start: 2021-02-01 | End: 2021-02-04 | Stop reason: HOSPADM

## 2021-02-01 RX ADMIN — AZITHROMYCIN 250 MG: 250 TABLET, FILM COATED ORAL at 08:39

## 2021-02-01 RX ADMIN — HEPARIN SODIUM 5000 UNITS: 5000 INJECTION INTRAVENOUS; SUBCUTANEOUS at 09:59

## 2021-02-01 RX ADMIN — INSULIN LISPRO 4 UNITS: 100 INJECTION, SOLUTION INTRAVENOUS; SUBCUTANEOUS at 17:08

## 2021-02-01 RX ADMIN — Medication 2000 UNITS: at 08:39

## 2021-02-01 RX ADMIN — SODIUM CHLORIDE, PRESERVATIVE FREE 10 ML: 5 INJECTION INTRAVENOUS at 08:43

## 2021-02-01 RX ADMIN — INSULIN LISPRO 2 UNITS: 100 INJECTION, SOLUTION INTRAVENOUS; SUBCUTANEOUS at 13:15

## 2021-02-01 RX ADMIN — HEPARIN SODIUM 5000 UNITS: 5000 INJECTION INTRAVENOUS; SUBCUTANEOUS at 01:53

## 2021-02-01 RX ADMIN — DEXAMETHASONE 6 MG: 4 TABLET ORAL at 08:39

## 2021-02-01 RX ADMIN — OXYCODONE HYDROCHLORIDE AND ACETAMINOPHEN 500 MG: 500 TABLET ORAL at 08:39

## 2021-02-01 RX ADMIN — GUAIFENESIN AND DEXTROMETHORPHAN 5 ML: 100; 10 SYRUP ORAL at 08:40

## 2021-02-01 RX ADMIN — SODIUM CHLORIDE: 9 INJECTION, SOLUTION INTRAVENOUS at 00:37

## 2021-02-01 RX ADMIN — HEPARIN SODIUM 5000 UNITS: 5000 INJECTION INTRAVENOUS; SUBCUTANEOUS at 17:09

## 2021-02-01 RX ADMIN — CEFTRIAXONE SODIUM 1000 MG: 1 INJECTION, POWDER, FOR SOLUTION INTRAMUSCULAR; INTRAVENOUS at 11:56

## 2021-02-01 RX ADMIN — HYDRALAZINE HYDROCHLORIDE 25 MG: 25 TABLET, FILM COATED ORAL at 21:24

## 2021-02-01 RX ADMIN — AMLODIPINE BESYLATE 5 MG: 5 TABLET ORAL at 09:47

## 2021-02-01 RX ADMIN — Medication 50 MG: at 08:39

## 2021-02-01 ASSESSMENT — PAIN SCALES - GENERAL
PAINLEVEL_OUTOF10: 0

## 2021-02-01 NOTE — PROGRESS NOTES
[address] [place] [address]  INPATIENT PHYSICAL THERAPY  DAILY NOTE  [facility] SURG 8A - 8A-11/011-A      Time In: 0915  Time Out: 6675  Timed Code Treatment Minutes: 24 Minutes  Minutes: 24          Date: 2021  Patient Name: Martha Saravia,  Gender:  male        MRN: [id number]  : 1955  (77 y.o.)  Referral Date : 21  Referring Practitioner: JOSE R Hinson CNP  Diagnosis: COVID-19 virus detected  Additional Pertinent Hx: Per H&P:66 y.o. male who presented to [address] [place] [address] with chest pain and shortness of breath; has medical history includes diabetes mellitus and he quit smoking 2 to 3 weeks ago; states for the past week he has had intermittent chest tightness along with lightheadedness and dizziness, occasional cough, shortness of breath, nausea and decreased oral intake; also relates to feeling quite fatigued; symptoms continuedso he presented to the emergency department for evaluation; upon initial assessment his O2 sat was 68% so he was placed on high flow with rapid improvement; he was tachypneic initially at 39 with a heart rate of 117 and since has improved to 28; he was found to be Covid positive; he is alert and oriented and speaking in complete sentences without any distress at my time of evaluation; he denies any significant pain; unsure of his Covid contact; denies any loss of taste or smell; he is being admitted to the hospital service for further care and evaluation.      Prior Level of Function:  Lives With: Other (comment)  Type of Home: Apartment  Home Layout: One level  Home Access: Stairs to enter with rails  Entrance Stairs - Number of Steps: 3  Entrance Stairs - Rails: Left  Home Equipment: (none)   Bathroom Shower/Tub: Tub only  Bathroom Toilet: Standard    ADL Assistance: [address] [place] [address] Avenue: Independent  Homemaking Responsibilities: Yes  Ambulation Assistance: Independent  Transfer Assistance: Independent Active : (pt reports had license taken away)  Additional Comments: Patient reports indepenent at [facility] with no AD    Restrictions/Precautions:  Restrictions/Precautions: Fall Risk, Isolation  Position Activity Restriction  Other position/activity restrictions: high flow 1/29--flow rate 50 L/min and FiO2 60-- 2/1 50L at 45%       SUBJECTIVE: pt cooperative and eager to get moving   Pts O2 sats were above 91% throughout session reps therapy came in towards end to adjust his High flow   PAIN: 0/10:       OBJECTIVE:  Bed Mobility:  Supine to Sit: Stand By Assistance    Transfers:  Sit to Stand: Stand By Assistance  Stand to Sit:Stand By Assistance  Pt impulsive but no loss of balance  Ambulation:  Stand By Assistance  Distance: forward and back   Surface: Level Tile  Device:No Device  Gait Deviations:  Pt limited due to high flow tubing he needed cues to slow down he reported feeling a little unsteady     Exercise:  Patient was guided in 1-2 set(s) 10 reps of exercise to both lower extremities. Standing heel/toe raises, Standing marches, Standing hip abduction/adduction, Standing hip extension, Standing hamstring curls, Mini squats and all wtih UE at support pt reported that he was feeling a little unsteady, he needed cues to slow down and work on posture and breathing , he also completed upper turnk rotations and shouler horz abd/add . Exercises were completed for increased independence with functional mobility. Functional Outcome Measures: Completed  AM-PAC Inpatient Mobility Raw Score : 20  AM-PAC Inpatient T-Scale Score : 47.67    ASSESSMENT:  Assessment: Patient progressing toward established goals. and pt tolerated activity well this date and maintained O2 sats above 91% however was still on high flow throughout session   Activity Tolerance:  Patient tolerance of  treatment: fair.         Equipment Recommendations:Equipment Needed: (continue to assess)  Discharge Recommendations: Continue to assess pending progress, Patient would benefit from continued therapy after discharge    Plan: Times per week: 5xGM  Times per day: Daily  Current Treatment Recommendations: Strengthening, Transfer Training, Endurance Training, Neuromuscular Re-education, Patient/Caregiver Education & Training, Balance Training, Gait Training, Home Exercise Program, Functional Mobility Training, Stair training, Safety Education & Training    Patient Education  Patient Education: Plan of Care    Goals:  Patient goals : go home  Short term goals  Time Frame for Short term goals: at discharge  Short term goal 1: Patient will complete sit < > stand wtih independence to stand to ambulate safely  Short term goal 2: Patient will ambulate 100' with no AD with modified independence to navigate home safely. Short term goal 3: Patient will ascend/descend 3 steps with 1 hand rail and SBA to access/leave home. Long term goals  Time Frame for Long term goals : N/A due to short estimated length of stay    Following session, patient left in safe position with all fall risk precautions in place.

## 2021-02-01 NOTE — PROGRESS NOTES
Hospitalist Progress Note    Patient:  Nicolasa Peck      Unit/Bed:8A-11/011-A    YOB: 1955    MRN: [id number]       Acct: [de-identified]     PCP: Ajit Wood MD    Date of Admission: 1/28/2021    Assessment/Plan:    1. COVID-19 infection--positive test on 1/28; Decadron 1/28, remdesivir 1/28 x 1 dose and holding secondary to #5, convalescent plasma on 1/28, 1/29, vitamin C, vitamin D and zinc, incentive spirometry and Acapella, PT and OT; ferritin at 389; D dimer at 1166; D dimer 1475 (1166)  2. Bilateral pneumonia (POA) likely secondary to COVID-19, possibly bacterial component--see #1; procalcitonin decreasing; Zithromax 1/28 x 5 days total,  Rocephin 1/29  3. Sepsis (POA) likely secondary to #1, #2--afebrile; much improved, monitor  4. Acute hypoxic respiratory failure--had an initial O2 sat of 68%; on high flow and is currently at 45% FiO2 and 50 L satting 93%; wean as able,  incentive spirometry and Acapella  5. JERMAINE--likely prerenal secondary to decreased p.o. intake, possibly secondary to cocaine; hold 0.9 normal saline at 50 mL's an hour 2nd to crackles; appreciate nephrology input; creatinine is improved to 2.1; was on lisinopril at home however that was not reordered here; avoid hypotension and nephrotoxic agents  6. Polycythemia, likely secondary to dehydration--see #5;  improved  7. Sinus tachycardia--resolved; continue telemetry  8. Diabetes mellitus type 2, uncontrolled--holding p.o. hypoglycemics; on sliding scale level 2 along with hypoglycemia protocol; monitor  9. Essential hypertension, controlled--takes lisinopril at home however that is on hold; Norvasc started today; blood pressure is stable  10. Remote smoker quitting 2 to 3 weeks ago  11. Cocaine abuse--urine drug screen and positive; history of same May 2, 2020     Expected discharge date:  To be determined    Disposition:    [x] Home       [] TCU       [] Rehab       [] Psych       [] SNF [] Christoph       [] Other-    Chief Complaint: Chest pain and shortness of breath    Hospital Course:  77 y.o. male who presented to [address] [address] with chest pain and shortness of breath; has medical history includes diabetes mellitus and he quit smoking 2 to 3 weeks ago; states for the past week he has had intermittent chest tightness along with lightheadedness and dizziness, occasional cough, shortness of breath, nausea and decreased oral intake; also relates to feeling quite fatigued; symptoms continuedso he presented to the emergency department for evaluation; upon initial assessment his O2 sat was 68% so he was placed on high flow with rapid improvement; he was tachypneic initially at 39 with a heart rate of 117 and since has improved to 28; he was found to be Covid positive; he is alert and oriented and speaking in complete sentences without any distress at my time of evaluation; he denies any significant pain; unsure of his Covid contact; denies any loss of taste or smell; he is being admitted to the hospital service for further care and evaluation.     1/29--> currently on high flow at 60% FiO2 and 50 L and satting 95%; creatinine increased to 4.1 from 3.1 so nephrology was consulted and appreciate their input; urine drug screen positive for cocaine    1/30--> on high flow oxygen at 65% FiO2 and 60 L; afebrile hemodynamically stable    1/31--> on high flow oxygen at 55% and 60 L and currently satting 92%; offers no complaints and states he is feeling better    2/1--> on high flow O2 at 45% FiO2 and 50 L and satting 93%; blood pressure up today so Norvasc was started; exam reveals some fine bibasilar crackles so I held the fluids until nephrology sees    Subjective (past 24 hours): States overall he is feeling better, still with cough; eating well    Medications:  Reviewed    Infusion Medications    sodium chloride 50 mL/hr at 02/01/21 0037    sodium chloride  sodium chloride      dextrose       Scheduled Medications    cefTRIAXone (ROCEPHIN) IV  1,000 mg Intravenous Q24H    sodium chloride flush  10 mL Intravenous 2 times per day    dexamethasone  6 mg Oral Daily    Vitamin D  2,000 Units Oral Daily    ascorbic acid  500 mg Oral Daily    zinc sulfate  50 mg Oral Daily    insulin lispro  0-12 Units Subcutaneous TID WC    insulin lispro  0-6 Units Subcutaneous Nightly    heparin (porcine)  5,000 Units Subcutaneous Q8H    azithromycin  250 mg Oral Daily     PRN Meds: sodium chloride, sodium chloride flush, promethazine **OR** ondansetron, polyethylene glycol, acetaminophen **OR** acetaminophen, guaiFENesin-dextromethorphan, sodium chloride, glucose, dextrose, glucagon (rDNA), dextrose, albuterol sulfate HFA, sodium chloride      Intake/Output Summary (Last 24 hours) at 2/1/2021 0455  Last data filed at 2/1/2021 0403  Gross per 24 hour   Intake 1842.41 ml   Output 1975 ml   Net -132.59 ml       Diet:  DIET CARB CONTROL; Exam:  BP (!) 147/93   Pulse 77   Temp 97.7 °F (36.5 °C) (Oral)   Resp 24   Ht 6' 2\" (1.88 m)   Wt 220 lb (99.8 kg)   SpO2 97%   BMI 28.25 kg/m²     General appearance: No apparent distress, appears stated age and cooperative. HEENT: Pupils equal, round, and reactive to light. Conjunctivae/corneas clear. Neck: Supple, with full range of motion. No jugular venous distention. Trachea midline. Respiratory:  Normal respiratory effort. Fine bibasilar crackles to auscultation, bilaterally; able to speak in complete sentences and not tachypneic  Cardiovascular: Regular rate and rhythm with normal S1/S2 without murmurs, rubs or gallops. Abdomen: Soft, non-tender, non-distended with normal bowel sounds. Musculoskeletal: passive and active ROM x 4 extremities. Skin: Skin color, texture, turgor normal.    Neurologic:  Neurovascularly intact without any focal sensory/motor deficits.  Cranial nerves: II-XII intact, grossly non-focal. Psychiatric: Alert and oriented x 4, thought content appropriate  Capillary Refill: Brisk,< 3 seconds   Peripheral Pulses: +2 palpable, equal bilaterally       Labs:   Recent Labs     01/29/21  0541 01/30/21  0535 01/31/21  0538   WBC 5.6 7.2 7.6   HGB 16.4 15.9 16.0   HCT 49.3 47.3 48.1    309 353     Recent Labs     01/29/21  0541 01/30/21  0535 01/31/21  0538    136 137   K 4.1 4.8 4.4  4.4   CL 93* 97* 98   CO2 28 24 26   BUN 58* 72* 67*   CREATININE 4.1* 3.4* 2.7*   CALCIUM 8.3* 8.4* 8.5     Recent Labs     01/29/21  0541 01/30/21  0535 01/31/21  0538   AST 38 32 31   ALT 17 19 23   BILITOT 0.4 0.2* 0.2*   ALKPHOS 48 50 50     No results for input(s): INR in the last 72 hours. No results for input(s): [employer] in the last 72 hours. Recent Labs     01/29/21  0541 01/30/21  0535   PROCAL 0.70* 0.56*       Microbiology:    [id number] positive on 1/28    Radiology:  Xr Chest Portable    Result Date: 1/28/2021  PROCEDURE: XR CHEST PORTABLE CLINICAL INFORMATION: hypoxic, covid COMPARISON: 5/2/2020 TECHNIQUE:  AP mobile chest single view  FINDINGS: There is mild to moderate enlargement of the cardiomediastinal silhouette. Bilateral interstitial opacities are present which may be related to edema. However there is more focal patchy consolidative airspace disease at the bilateral right greater than left lung bases which may represent underlying pneumonia. No pneumothorax or  pleural effusion is seen. No acute osseous findings are demonstrated. 1. Bilateral diffuse interstitial opacities are present which may represent edema and/or pneumonia. However there are more focal consolidative opacities at the right greater than left lung bases which likely represents underlying pneumonia. Recommend continued follow-up. 2. Mild to moderate enlargement of the cardiomediastinal silhouette is noted. **This report has been created using voice recognition software. It may contain minor errors which are inherent in voice recognition technology. ** Final report electronically signed by Dr. Jorgito Adames on 1/28/2021 10:30 AM      DVT prophylaxis: [] Lovenox                                 [] SCDs                                 [x] SQ Heparin                                 [] Encourage ambulation           [] Already on Anticoagulation     Code Status: Full Code    Tele:   [x] yes sinus rhythm heart rate 80             [] no    Active Hospital Problems    Diagnosis Date Noted    COVID-19 virus detected [U07.1] 01/28/2021       Electronically signed by JOSE R Flores CNP on 2/1/2021 at 4:55 AM

## 2021-02-01 NOTE — PROGRESS NOTES
[address] [place] [address]  Occupational Therapy  Daily Note  Time:   Time In: 08  Time Out: 1500  Timed Code Treatment Minutes: 26 Minutes  Minutes: 26          Date: 2021  Patient Name: Willow Garcia,   Gender: male      Room: [facility]  MRN: [id number]  : 1955  (77 y.o.)  Referring Practitioner: JOSE R Moreira CNP  Diagnosis: COVID-19  Additional Pertinent Hx: Per H&P:66 y.o. male who presented to [address] [place] [address] with chest pain and shortness of breath; has medical history includes diabetes mellitus and he quit smoking 2 to 3 weeks ago; states for the past week he has had intermittent chest tightness along with lightheadedness and dizziness, occasional cough, shortness of breath, nausea and decreased oral intake; also relates to feeling quite fatigued; symptoms continuedso he presented to the emergency department for evaluation; upon initial assessment his O2 sat was 68% so he was placed on high flow with rapid improvement; he was tachypneic initially at 39 with a heart rate of 117 and since has improved to 28; he was found to be Covid positive; he is alert and oriented and speaking in complete sentences without any distress at my time of evaluation; he denies any significant pain; unsure of his Covid contact; denies any loss of taste or smell; he is being admitted to the hospital service for further care and evaluation. Restrictions/Precautions:  Restrictions/Precautions: Fall Risk, Isolation  Position Activity Restriction  Other position/activity restrictions: high flow --flow rate 50 L/min and FiO2 60--  50L at 45%     SUBJECTIVE: Nurse Jovani baez session, In bed upon arrival, agreeable to OT session    Patient on 40 L O2 at 41% via High Flow  upon arrival to room. Patient O2 sats at 94 %. Upon activity patient dropping O2 at 87 %. Pt requiring minimak rest break(s) to recover.        PAIN: 0/10:     COGNITION: WFL    ADL: Bathing: Stand By Assistance. Completed sitting at EOB, SBA when standing to wash buttocks and zunilda area  Toileting: with set-up. used urinal sitting at EOB. Grooming:  Brushed teeth sitting at EOB with set up    BED MOBILITY:  Supine to Sit: Supervision HOB elevated, used ebdrail    TRANSFERS:  Sit to Stand:  Stand By Assistance. EOB  Stand to Sit: Supervision. ASSESSMENT:     Activity Tolerance:  Patient tolerance of  treatment: good. Discharge Recommendations: Continue to assess pending progress   Equipment Recommendations: Equipment Needed: No  Plan: Times per week: 5x  Times per day: Daily  Current Treatment Recommendations: Strengthening, Balance Training, Functional Mobility Training, Endurance Training, Patient/Caregiver Education & Training, Safety Education & Training, Self-Care / ADL, Home Management Training    Patient Education  Patient Education: ADL's    Goals  Short term goals  Time Frame for Short term goals: by d/c  Short term goal 1: Pt will complete functional mobility to/from BR with SBA, maintaining O2 >90% and no LOB to increase indep with ADL routine  Short term goal 2: Pt will tolerate dynamic standing x4 min with Sup and B hand release to increase indep with groomign  Short term goal 3: Pt will complete various t/fs with Sup and no safety cues to increase indep with toileting  Short term goal 4: Pt will complete BADL routine with no > than SBA to increase indep with bathing  Long term goals  Time Frame for Long term goals : no LTG d/t short ELOS    Following session, patient left in safe position with all fall risk precautions in place.

## 2021-02-01 NOTE — PROGRESS NOTES
[facility]         Renal Inpatient Follow-Up note         2/1/2021 8:35 AM    Pt Name:   Day Burns  YOB: 1955  Attending:   JOSE R Zamarripa *    Chief Complaint : Day Burns is a 77 y.o. male being followed by nephrology for acute kidney injury    Interval History :   Patient seen and examined by me. No distress  Feels well denies any chest pain or shortness of breath  He is still requiring high flow oxygen, but apparently getting better     Scheduled Medications :    cefTRIAXone (ROCEPHIN) IV  1,000 mg Intravenous Q24H    sodium chloride flush  10 mL Intravenous 2 times per day    dexamethasone  6 mg Oral Daily    Vitamin D  2,000 Units Oral Daily    ascorbic acid  500 mg Oral Daily    zinc sulfate  50 mg Oral Daily    insulin lispro  0-12 Units Subcutaneous TID WC    insulin lispro  0-6 Units Subcutaneous Nightly    heparin (porcine)  5,000 Units Subcutaneous Q8H    azithromycin  250 mg Oral Daily      [Held by provider] sodium chloride Stopped (02/01/21 0647)    sodium chloride      sodium chloride      dextrose         Vitals :  BP (!) 148/105   Pulse 83   Temp 96.7 °F (35.9 °C) (Oral)   Resp 24   Ht 6' 2\" (1.88 m)   Wt 220 lb (99.8 kg)   SpO2 93%   BMI 28.25 kg/m²     24HR INTAKE/OUTPUT:      Intake/Output Summary (Last 24 hours) at 2/1/2021 0835  Last data filed at 2/1/2021 0403  Gross per 24 hour   Intake 1842.41 ml   Output 1575 ml   Net 267.41 ml     Last 3 weights  Wt Readings from Last 3 Encounters:   01/28/21 220 lb (99.8 kg)   05/03/20 228 lb 4.8 oz (103.6 kg)   10/02/18 205 lb (93 kg)           Physical Exam : Limited due to COVID-19  General Appearance:  Well developed.  No distress  Mouth/Throat:  Oral mucosa moist  Neck: Good movements  CNS-grossly intact  Psych-not agitated  Musculoskeletal:  no edema         Last 3 CBC   Recent Labs     01/30/21  0535 01/31/21  0538 02/01/21  0556   WBC 7.2 7.6 6.8 RBC 5.00 5.09 5.20   HGB 15.9 16.0 16.2   HCT 47.3 48.1 49.4    353 383     Last 3 CMP  Recent Labs     01/30/21  0535 01/31/21  0538 02/01/21  0556    137 137   K 4.8 4.4  4.4 4.3  4.3   CL 97* 98 102   CO2 24 26 28   BUN 72* 67* 53*   CREATININE 3.4* 2.7* 2.1*   CALCIUM 8.4* 8.5 8.5   LABALBU 2.8* 2.6* 2.6*   BILITOT 0.2* 0.2* 0.3             Assessment    1. Renal -acute kidney injury, most likely secondary to multifactorial etiology primarily due to the extensive use of nonsteroidal anti-inflammatories combined with ACE inhibitor. May have some component of prerenal as well. ? Does not look overtly congested will needing high flow oxygen  ? Urine lites show ATN picture urine eosinophils are negative, renal ultrasound scan normal  ? Renal function improving with hydration. Currently on hold  ? Monitor off IV fluids and see how he does     2. Electrolytes -appear to be within normal limits  3. Acid-base status-appears to be stable  4. Essential hypertension running high will start on Norvasc 5 mg p.o. daily  5. COVID-19 pneumonia  6. Hx of diabetes mellitus  7. Meds reviewed and discussed with patient and nursing staff      HILLARY Bolden D.  [facility].

## 2021-02-01 NOTE — CARE COORDINATION
DISASTER CHARTING    2/1/21, 12:06 PM EST    DISCHARGE ONGOING EVALUATION:     Erick Johnson day: 4  Location: 8A-11/011-A Reason for admit: COVID-19 virus detected [U07.1]   Barriers to Discharge: remains on HF 45%, 50L. New norvasc for hypertension. Crackles. Creatinine 2.1, nephro following. Procal 0.18. PCP: Elvis Viveros MD  Readmission Risk Score: 13%  Patient Goals/Plan/Treatment Preferences: Plans home with room mate. SR DME if home O2 needed. Monitor for further needs.

## 2021-02-02 LAB
ALBUMIN SERPL-MCNC: 2.8 G/DL (ref 3.5–5.1)
ALP BLD-CCNC: 57 U/L (ref 38–126)
ALT SERPL-CCNC: 37 U/L (ref 11–66)
ANION GAP SERPL CALCULATED.3IONS-SCNC: 12 MEQ/L (ref 8–16)
AST SERPL-CCNC: 26 U/L (ref 5–40)
BASOPHILS # BLD: 0.1 %
BASOPHILS ABSOLUTE: 0 THOU/MM3 (ref 0–0.1)
BILIRUB SERPL-MCNC: 0.4 MG/DL (ref 0.3–1.2)
BUN BLDV-MCNC: 49 MG/DL (ref 7–22)
CALCIUM SERPL-MCNC: 9.3 MG/DL (ref 8.5–10.5)
CHLORIDE BLD-SCNC: 103 MEQ/L (ref 98–111)
CO2: 26 MEQ/L (ref 23–33)
CREAT SERPL-MCNC: 2.1 MG/DL (ref 0.4–1.2)
EOSINOPHIL # BLD: 0.6 %
EOSINOPHILS ABSOLUTE: 0 THOU/MM3 (ref 0–0.4)
ERYTHROCYTE [DISTWIDTH] IN BLOOD BY AUTOMATED COUNT: 12.1 % (ref 11.5–14.5)
ERYTHROCYTE [DISTWIDTH] IN BLOOD BY AUTOMATED COUNT: 42.6 FL (ref 35–45)
FERRITIN: 495 NG/ML (ref 22–322)
GLUCOSE BLD-MCNC: 125 MG/DL (ref 70–108)
GLUCOSE BLD-MCNC: 141 MG/DL (ref 70–108)
GLUCOSE BLD-MCNC: 160 MG/DL (ref 70–108)
GLUCOSE BLD-MCNC: 270 MG/DL (ref 70–108)
GLUCOSE BLD-MCNC: 288 MG/DL (ref 70–108)
HCT VFR BLD CALC: 51.8 % (ref 42–52)
HEMOGLOBIN: 17.2 GM/DL (ref 14–18)
IMMATURE GRANS (ABS): 0.03 THOU/MM3 (ref 0–0.07)
IMMATURE GRANULOCYTES: 0.4 %
LYMPHOCYTES # BLD: 16.2 %
LYMPHOCYTES ABSOLUTE: 1.2 THOU/MM3 (ref 1–4.8)
MCH RBC QN AUTO: 31.4 PG (ref 26–33)
MCHC RBC AUTO-ENTMCNC: 33.2 GM/DL (ref 32.2–35.5)
MCV RBC AUTO: 94.7 FL (ref 80–94)
MONOCYTES # BLD: 13.2 %
MONOCYTES ABSOLUTE: 0.9 THOU/MM3 (ref 0.4–1.3)
NUCLEATED RED BLOOD CELLS: 0 /100 WBC
PLATELET # BLD: 436 THOU/MM3 (ref 130–400)
PMV BLD AUTO: 11.6 FL (ref 9.4–12.4)
POTASSIUM REFLEX MAGNESIUM: 4.4 MEQ/L (ref 3.5–5.2)
POTASSIUM SERPL-SCNC: 4.4 MEQ/L (ref 3.5–5.2)
RBC # BLD: 5.47 MILL/MM3 (ref 4.7–6.1)
SEG NEUTROPHILS: 69.5 %
SEGMENTED NEUTROPHILS ABSOLUTE COUNT: 4.9 THOU/MM3 (ref 1.8–7.7)
SODIUM BLD-SCNC: 141 MEQ/L (ref 135–145)
TOTAL PROTEIN: 7 G/DL (ref 6.1–8)
WBC # BLD: 7.1 THOU/MM3 (ref 4.8–10.8)

## 2021-02-02 PROCEDURE — 6370000000 HC RX 637 (ALT 250 FOR IP): Performed by: INTERNAL MEDICINE

## 2021-02-02 PROCEDURE — 6370000000 HC RX 637 (ALT 250 FOR IP): Performed by: NURSE PRACTITIONER

## 2021-02-02 PROCEDURE — 6370000000 HC RX 637 (ALT 250 FOR IP): Performed by: PHYSICIAN ASSISTANT

## 2021-02-02 PROCEDURE — 97530 THERAPEUTIC ACTIVITIES: CPT

## 2021-02-02 PROCEDURE — 2580000003 HC RX 258: Performed by: NURSE PRACTITIONER

## 2021-02-02 PROCEDURE — 82948 REAGENT STRIP/BLOOD GLUCOSE: CPT

## 2021-02-02 PROCEDURE — 82728 ASSAY OF FERRITIN: CPT

## 2021-02-02 PROCEDURE — 94761 N-INVAS EAR/PLS OXIMETRY MLT: CPT

## 2021-02-02 PROCEDURE — 6360000002 HC RX W HCPCS: Performed by: NURSE PRACTITIONER

## 2021-02-02 PROCEDURE — 2500000003 HC RX 250 WO HCPCS: Performed by: PHYSICIAN ASSISTANT

## 2021-02-02 PROCEDURE — 80053 COMPREHEN METABOLIC PANEL: CPT

## 2021-02-02 PROCEDURE — 85025 COMPLETE CBC W/AUTO DIFF WBC: CPT

## 2021-02-02 PROCEDURE — 2060000000 HC ICU INTERMEDIATE R&B

## 2021-02-02 PROCEDURE — 97110 THERAPEUTIC EXERCISES: CPT

## 2021-02-02 PROCEDURE — 99232 SBSQ HOSP IP/OBS MODERATE 35: CPT | Performed by: NURSE PRACTITIONER

## 2021-02-02 PROCEDURE — 99232 SBSQ HOSP IP/OBS MODERATE 35: CPT | Performed by: INTERNAL MEDICINE

## 2021-02-02 PROCEDURE — 36415 COLL VENOUS BLD VENIPUNCTURE: CPT

## 2021-02-02 RX ORDER — LABETALOL 20 MG/4 ML (5 MG/ML) INTRAVENOUS SYRINGE
5 ONCE
Status: COMPLETED | OUTPATIENT
Start: 2021-02-02 | End: 2021-02-02

## 2021-02-02 RX ADMIN — Medication 2000 UNITS: at 08:03

## 2021-02-02 RX ADMIN — SODIUM CHLORIDE, PRESERVATIVE FREE 10 ML: 5 INJECTION INTRAVENOUS at 21:23

## 2021-02-02 RX ADMIN — AMLODIPINE BESYLATE 5 MG: 5 TABLET ORAL at 08:03

## 2021-02-02 RX ADMIN — HEPARIN SODIUM 5000 UNITS: 5000 INJECTION INTRAVENOUS; SUBCUTANEOUS at 17:15

## 2021-02-02 RX ADMIN — HYDRALAZINE HYDROCHLORIDE 25 MG: 25 TABLET, FILM COATED ORAL at 08:03

## 2021-02-02 RX ADMIN — LABETALOL 20 MG/4 ML (5 MG/ML) INTRAVENOUS SYRINGE 5 MG: at 21:40

## 2021-02-02 RX ADMIN — OXYCODONE HYDROCHLORIDE AND ACETAMINOPHEN 500 MG: 500 TABLET ORAL at 08:03

## 2021-02-02 RX ADMIN — INSULIN LISPRO 6 UNITS: 100 INJECTION, SOLUTION INTRAVENOUS; SUBCUTANEOUS at 17:15

## 2021-02-02 RX ADMIN — SODIUM CHLORIDE, PRESERVATIVE FREE 10 ML: 5 INJECTION INTRAVENOUS at 08:04

## 2021-02-02 RX ADMIN — HEPARIN SODIUM 5000 UNITS: 5000 INJECTION INTRAVENOUS; SUBCUTANEOUS at 10:04

## 2021-02-02 RX ADMIN — Medication 50 MG: at 08:03

## 2021-02-02 RX ADMIN — CEFTRIAXONE SODIUM 1000 MG: 1 INJECTION, POWDER, FOR SOLUTION INTRAMUSCULAR; INTRAVENOUS at 11:59

## 2021-02-02 RX ADMIN — HEPARIN SODIUM 5000 UNITS: 5000 INJECTION INTRAVENOUS; SUBCUTANEOUS at 01:10

## 2021-02-02 RX ADMIN — HYDRALAZINE HYDROCHLORIDE 25 MG: 25 TABLET, FILM COATED ORAL at 17:22

## 2021-02-02 RX ADMIN — INSULIN LISPRO 2 UNITS: 100 INJECTION, SOLUTION INTRAVENOUS; SUBCUTANEOUS at 12:05

## 2021-02-02 RX ADMIN — DEXAMETHASONE 6 MG: 4 TABLET ORAL at 08:03

## 2021-02-02 ASSESSMENT — PAIN SCALES - GENERAL
PAINLEVEL_OUTOF10: 0

## 2021-02-02 NOTE — PROGRESS NOTES
S/O called asking for update stating that the day shift RN did not call to give one. Update given and all questions addressed at this time.

## 2021-02-02 NOTE — PROGRESS NOTES
Hospitalist Progress Note    Patient:  Jay Jay Mondragon      Unit/Bed:8A-11/011-A    YOB: 1955    MRN: [id number]       Acct: [de-identified]     PCP: Jake Braga MD    Date of Admission: 1/28/2021    Assessment/Plan:    1. COVID-19 infection--positive test on 1/28; Decadron 1/28, remdesivir 1/28 x 1 dose and holding secondary to #5, convalescent plasma on 1/28, 1/29, vitamin C, vitamin D and zinc, incentive spirometry and Acapella, PT and OT; ferritin at 495  2. Bilateral pneumonia (POA) likely secondary to COVID-19, possibly bacterial component--see #1; Zithromax 1/28 x 5 days total,  Rocephin 1/29  3. Sepsis (POA) likely secondary to #1, #2--afebrile; much improved, monitor  4. Acute hypoxic respiratory failure--had an initial O2 sat of 68%; on high flow and is currently at 30% FiO2 and 20 L satting 94%; wean as able,  incentive spirometry and Acapella  5. JERMAINE--likely prerenal secondary to decreased p.o. intake, possibly secondary to cocaine; appreciate nephrology input; creatinine remains at 2.1; was on lisinopril at home however that was not reordered here; avoid hypotension and nephrotoxic agents  6. Polycythemia, likely secondary to dehydration--see #5;  improved  7. Sinus tachycardia--resolved; continue telemetry  8. Diabetes mellitus type 2, uncontrolled--holding p.o. hypoglycemics; on sliding scale level 2 along with hypoglycemia protocol; monitor  9. Essential hypertension, controlled--takes lisinopril at home however that is on hold; Norvasc started today; blood pressure is stable  10. Remote smoker quitting 2 to 3 weeks ago  11. Cocaine abuse--urine drug screen and positive; history of same May 2, 2020     Expected discharge date:  To be determined    Disposition:    [x] Home       [] TCU       [] Rehab       [] Psych       [] SNF       [] [place]       [] Other-    Chief Complaint: Chest pain and shortness of breath Hospital Course:  77 y.o. male who presented to [address] [place] [address] with chest pain and shortness of breath; has medical history includes diabetes mellitus and he quit smoking 2 to 3 weeks ago; states for the past week he has had intermittent chest tightness along with lightheadedness and dizziness, occasional cough, shortness of breath, nausea and decreased oral intake; also relates to feeling quite fatigued; symptoms continuedso he presented to the emergency department for evaluation; upon initial assessment his O2 sat was 68% so he was placed on high flow with rapid improvement; he was tachypneic initially at 39 with a heart rate of 117 and since has improved to 28; he was found to be Covid positive; he is alert and oriented and speaking in complete sentences without any distress at my time of evaluation; he denies any significant pain; unsure of his Covid contact; denies any loss of taste or smell; he is being admitted to the hospital service for further care and evaluation.     1/29--> currently on high flow at 60% FiO2 and 50 L and satting 95%; creatinine increased to 4.1 from 3.1 so nephrology was consulted and appreciate their input; urine drug screen positive for cocaine    1/30--> on high flow oxygen at 65% FiO2 and 60 L; afebrile hemodynamically stable    1/31--> on high flow oxygen at 55% and 60 L and currently satting 92%; offers no complaints and states he is feeling better    2/1--> on high flow O2 at 45% FiO2 and 50 L and satting 93%; blood pressure up today so Norvasc was started; exam reveals some fine bibasilar crackles so I held the fluids until nephrology sees    2/2--> on high flow at 30% FiO2 and 20 L satting 94%; not in distress at this time, creatinine at 2.1    Subjective (past 24 hours): feeling better, still with cough; eating well    Medications:  Reviewed    Infusion Medications    sodium chloride      sodium chloride      dextrose       Scheduled Medications  amLODIPine  5 mg Oral Daily    cefTRIAXone (ROCEPHIN) IV  1,000 mg Intravenous Q24H    sodium chloride flush  10 mL Intravenous 2 times per day    dexamethasone  6 mg Oral Daily    Vitamin D  2,000 Units Oral Daily    ascorbic acid  500 mg Oral Daily    zinc sulfate  50 mg Oral Daily    insulin lispro  0-12 Units Subcutaneous TID WC    insulin lispro  0-6 Units Subcutaneous Nightly    heparin (porcine)  5,000 Units Subcutaneous Q8H     PRN Meds: hydrALAZINE, sodium chloride, sodium chloride flush, promethazine **OR** ondansetron, polyethylene glycol, acetaminophen **OR** acetaminophen, guaiFENesin-dextromethorphan, sodium chloride, glucose, dextrose, glucagon (rDNA), dextrose, albuterol sulfate HFA, sodium chloride      Intake/Output Summary (Last 24 hours) at 2/2/2021 0710  Last data filed at 2/2/2021 0329  Gross per 24 hour   Intake 1180 ml   Output 1800 ml   Net -620 ml       Diet:  DIET CARB CONTROL; Exam:  BP (!) 145/98   Pulse 73   Temp 98.1 °F (36.7 °C) (Tympanic)   Resp 18   Ht 6' 2\" (1.88 m)   Wt 220 lb (99.8 kg)   SpO2 94%   BMI 28.25 kg/m²     General appearance: No apparent distress, appears stated age and cooperative. HEENT: Pupils equal, round, and reactive to light. Conjunctivae/corneas clear. Neck: Supple, with full range of motion. No jugular venous distention. Trachea midline. Respiratory:  Normal respiratory effort. Diminished to auscultation, bilaterally; able to speak in complete sentences and not tachypneic  Cardiovascular: Regular rate and rhythm with normal S1/S2 without murmurs, rubs or gallops. Abdomen: Soft, non-tender, non-distended with normal bowel sounds. Musculoskeletal: passive and active ROM x 4 extremities. Skin: Skin color, texture, turgor normal.    Neurologic:  Neurovascularly intact without any focal sensory/motor deficits.  Cranial nerves: II-XII intact, grossly non-focal.  Psychiatric: Alert and oriented x 4, thought content appropriate Capillary Refill: Brisk,< 3 seconds   Peripheral Pulses: +2 palpable, equal bilaterally       Labs:   Recent Labs     01/31/21  0538 02/01/21  0556 02/02/21  0544   WBC 7.6 6.8 7.1   HGB 16.0 16.2 17.2   HCT 48.1 49.4 51.8    383 436*     Recent Labs     01/31/21  0538 02/01/21  0556 02/02/21  0544    137 141   K 4.4  4.4 4.3  4.3 4.4   CL 98 102 103   CO2 26 28 26   BUN 67* 53* 49*   CREATININE 2.7* 2.1* 2.1*   CALCIUM 8.5 8.5 9.3     Recent Labs     01/31/21  0538 02/01/21  0556 02/02/21  0544   AST 31 29 26   ALT 23 30 37   BILITOT 0.2* 0.3 0.4   ALKPHOS 50 48 57     No results for input(s): INR in the last 72 hours. No results for input(s): Onetha Librado in the last 72 hours. Recent Labs     02/01/21  0556   PROCAL 0.18*       Microbiology:    [id number] positive on 1/28    Radiology:  Xr Chest Portable    Result Date: 1/28/2021  PROCEDURE: XR CHEST PORTABLE CLINICAL INFORMATION: hypoxic, covid COMPARISON: 5/2/2020 TECHNIQUE:  AP mobile chest single view  FINDINGS: There is mild to moderate enlargement of the cardiomediastinal silhouette. Bilateral interstitial opacities are present which may be related to edema. However there is more focal patchy consolidative airspace disease at the bilateral right greater than left lung bases which may represent underlying pneumonia. No pneumothorax or  pleural effusion is seen. No acute osseous findings are demonstrated. 1. Bilateral diffuse interstitial opacities are present which may represent edema and/or pneumonia. However there are more focal consolidative opacities at the right greater than left lung bases which likely represents underlying pneumonia. Recommend continued follow-up. 2. Mild to moderate enlargement of the cardiomediastinal silhouette is noted. **This report has been created using voice recognition software. It may contain minor errors which are inherent in voice recognition technology. ** Final report electronically signed by Dr. Luisa Child on 1/28/2021 10:30 AM      DVT prophylaxis: [] Lovenox                                 [] SCDs                                 [x] SQ Heparin                                 [] Encourage ambulation           [] Already on Anticoagulation     Code Status: Full Code    Tele:   [] yes               [x] no    Active Hospital Problems    Diagnosis Date Noted    COVID-19 virus detected [U07.1] 01/28/2021       Electronically signed by JOSE R Paula CNP on 2/2/2021 at 7:40 AM

## 2021-02-02 NOTE — PROGRESS NOTES
[address] [place] [address]  Occupational Therapy  Daily Note  Time:   Time In: 1406  Time Out: 1430  Timed Code Treatment Minutes: 24 Minutes  Minutes: 24          Date: 2021  Patient Name: Kady Medina,   Gender: male      Room: [facility]  MRN: [id number]  : 1955  (77 y.o.)  Referring Practitioner: JOSE R Flores CNP  Diagnosis: COVID-19  Additional Pertinent Hx: Per H&P:66 y.o. male who presented to [facility] with chest pain and shortness of breath; has medical history includes diabetes mellitus and he quit smoking 2 to 3 weeks ago; states for the past week he has had intermittent chest tightness along with lightheadedness and dizziness, occasional cough, shortness of breath, nausea and decreased oral intake; also relates to feeling quite fatigued; symptoms continuedso he presented to the emergency department for evaluation; upon initial assessment his O2 sat was 68% so he was placed on high flow with rapid improvement; he was tachypneic initially at 39 with a heart rate of 117 and since has improved to 28; he was found to be Covid positive; he is alert and oriented and speaking in complete sentences without any distress at my time of evaluation; he denies any significant pain; unsure of his Covid contact; denies any loss of taste or smell; he is being admitted to the hospital service for further care and evaluation. Restrictions/Precautions:  Restrictions/Precautions: Fall Risk, Isolation  Position Activity Restriction  Other position/activity restrictions: high flow --flow rate 50 L/min and FiO2 60--  50L at 45%     SUBJECTIVE: Nurse Elham baez session, In bed upon arrival, agreeable to OT session, talkative    Patient on 20 L O2 Fi02 at 32% via High Flow  upon arrival to room. Patient O2 sats at 92 %. Upon activity patient dropping O2 at 87 %. Pt requiring minimal rest break(s) to recover.        PAIN: 0/10: COGNITION: Decreased Insight, Impaired Memory, Decreased Problem Solving and Decreased Safety Awareness    ADL:   Grooming: with set-up. Brushed teeth and washed face sitting at EOB  Lower Extremity Dressing: Supervision. donned/doffed B slipper socks sitting at EOB. BALANCE:  Sitting Balance:  Supervision. at EOB greater than 10 minutes    BED MOBILITY:  Supine to Sit: Supervision HOB elevated, used bedrail    ADDITIONAL ACTIVITIES:  Guided patient through BUE AROM this date x10 reps x1 set at EOB in order to increase BUE strength and improve activity tolerance for ADLs and homemaking tasks. ASSESSMENT:     Activity Tolerance:  Patient tolerance of  treatment: good. Discharge Recommendations: Continue to assess pending progress   Equipment Recommendations: Equipment Needed: No  Plan: Times per week: 5x  Times per day: Daily  Current Treatment Recommendations: Strengthening, Balance Training, Functional Mobility Training, Endurance Training, Patient/Caregiver Education & Training, Safety Education & Training, Self-Care / ADL, Home Management Training    Patient Education  Patient Education: IADL's and Home Exercise Program    Goals  Short term goals  Time Frame for Short term goals: by d/c  Short term goal 1: Pt will complete functional mobility to/from BR with SBA, maintaining O2 >90% and no LOB to increase indep with ADL routine  Short term goal 2: Pt will tolerate dynamic standing x4 min with Sup and B hand release to increase indep with groomign  Short term goal 3: Pt will complete various t/fs with Sup and no safety cues to increase indep with toileting  Short term goal 4: Pt will complete BADL routine with no > than SBA to increase indep with bathing  Long term goals  Time Frame for Long term goals : no LTG d/t short ELOS    Following session, patient left in safe position with all fall risk precautions in place.

## 2021-02-02 NOTE — PLAN OF CARE
Problem: Nutrition  Goal: Optimal nutrition therapy  Outcome: Ongoing   Nutrition Problem #1: Inadequate oral intake  Intervention: Food and/or Nutrient Delivery: Continue Current Diet, Vitamin Supplement(Pt declines need for ONS during LOS)  Nutritional Goals: Pt will consume 75% or more of meals during LOS

## 2021-02-02 NOTE — PROGRESS NOTES
[facility]  INPATIENT PHYSICAL THERAPY  DAILY NOTE  [facility] MED SURG 8A - 8A-11/011-A      Time In: 8920  Time Out: 1100  Timed Code Treatment Minutes: 40 Minutes  Minutes: 40          Date: 2021  Patient Name: Cleveland Ordonez,  Gender:  male        MRN: [id number]  : 1955  (77 y.o.)  Referral Date : 21  Referring Practitioner: JOSE R Blanton CNP  Diagnosis: COVID-19 virus detected  Additional Pertinent Hx: Per H&P:66 y.o. male who presented to [facility] with chest pain and shortness of breath; has medical history includes diabetes mellitus and he quit smoking 2 to 3 weeks ago; states for the past week he has had intermittent chest tightness along with lightheadedness and dizziness, occasional cough, shortness of breath, nausea and decreased oral intake; also relates to feeling quite fatigued; symptoms continuedso he presented to the emergency department for evaluation; upon initial assessment his O2 sat was 68% so he was placed on high flow with rapid improvement; he was tachypneic initially at 39 with a heart rate of 117 and since has improved to 28; he was found to be Covid positive; he is alert and oriented and speaking in complete sentences without any distress at my time of evaluation; he denies any significant pain; unsure of his Covid contact; denies any loss of taste or smell; he is being admitted to the hospital service for further care and evaluation.      Prior Level of Function:  Lives With: Other (comment)  Type of Home: Apartment  Home Layout: One level  Home Access: Stairs to enter with rails  Entrance Stairs - Number of Steps: 3  Entrance Stairs - Rails: Left  Home Equipment: (none)   Bathroom Shower/Tub: Tub only  Bathroom Toilet: Standard    ADL Assistance: [address]: Independent  Homemaking Responsibilities: Yes  Ambulation Assistance: Independent  Transfer Assistance: Independent Active : (pt reports had license taken away)  Additional Comments: Patient reports indepenent at [facility] with no AD    Restrictions/Precautions:  Restrictions/Precautions: Fall Risk, Isolation  Position Activity Restriction  Other position/activity restrictions: high flow 1/29--flow rate 50 L/min and FiO2 60-- 2/1 50L at 45%  2/2 20L @29%     SUBJECTIVE: nursing ok'd therapy- pt was sleeping on arrival he took extra time to wake up pt was more quiet this date he would answer questions but with just a yes or no, did monitor O2 sats and HR noted elevated HR in 130's with activity today his O2 sats varied from 88-92% and he required many rest breaks     PAIN: 0/10:       OBJECTIVE:  Bed Mobility:  Supine to Sit: Supervision  Sit to Supine: Supervision     Transfers:  Sit to Stand: Stand By Assistance  Stand to Sit:Supervision to SBA  Pt was impulsive at times- as we were monitoring his HR and O2 sats he did require cues to take breaks and sit to recover     Ambulation:  Stand By Assistance  Distance: 4x2  Surface: Level Tile  Device:No Device  Gait Deviations:  Pt limited due to short line and also due to increased HR in 130's this date     Balance:  Standing at table for ex with SBA he stood a lot to work on breathing technique and for his O2 sats to recover to 90% however his HR was elevated- he took some standing and some sitting rest breaks     Exercise:  Patient was guided in 1 set(s) 10 reps of exercise to both lower extremities. Standing heel/toe raises, Standing marches, Standing hip abduction/adduction, Standing hip extension, Standing hamstring curls, Mini squats and trunk extension with breathing technqiue and upper trunk rotations . Exercises were completed for increased independence with functional mobility.     Functional Outcome Measures: Completed  AM-PAC Inpatient Mobility Raw Score : 19  AM-PAC Inpatient T-Scale Score : 45.44    ASSESSMENT:

## 2021-02-02 NOTE — PROGRESS NOTES
Comprehensive Nutrition Assessment    Type and Reason for Visit:  RD Nutrition Re-Screen/LOS    Nutrition Recommendations/Plan:   *Recommend a Multivitamin w/minerals daily. *Continue current diet. *Pt declines ONS during LOS    Nutrition Assessment: Pt. nutritionally compromised AEB pt report of decreased oral intake x1 week PTA; however, pt reports improved appetite and intake of meals since admit. At risk for further nutrition compromise r/t COVID+ and underlying medical condition (hx DM, HTN). Nutrition recommendations/interventions as per above. Malnutrition Assessment:  Malnutrition Status:  Insufficient data    Context:  Acute Illness     Findings of the 6 clinical characteristics of malnutrition:  Energy Intake:  No significant decrease in energy intake  Weight Loss:  Unable to assess(awaiting recent admit weight)     Body Fat Loss:  Unable to assess     Muscle Mass Loss:  Unable to assess    Fluid Accumulation:  No significant fluid accumulation Extremities   Strength:  Not Performed    Estimated Daily Nutrient Needs:  Energy (kcal):  6967-0878 kcal/day (18-20 kcal/kg) - pt in acute COVID phase; Weight Used for Energy Requirements:  (99.8 kg on 1/28- stated weight)     Protein (g):  119-150 g/day (1.2-1,5 g/kg); Weight Used for Protein Requirements:  (99.8 kg on 1/28- estimated)        Fluid (ml/day):  per MD    Nutrition Related Findings:  COVID+ on 1/28; pt reports decreased oral intake x1 weeks PTA, but states appetite and intake of meals has improved since admit; pt denies N/V or chewing/swallowing difficulty with food;l ast BM x1 on 2/1. Labs: BUN 49, Cr. 2.1, POC Glucose 125, HgA1C 7% on 5/3/2020. Rx includes: Vitamin C, Rocephin, Decadron, Humalog, Vitamin D and Zinc      Wounds:  None       Current Nutrition Therapies:    DIET CARB CONTROL;     Anthropometric Measures:  · Height: 6' 2\" (188 cm)  · Current Body Weight: 220 lb (99.8 kg)(1/28; stated weight; no edema noted) · Admission Body Weight: 220 lb (99.8 kg)(1/28; stated weight; no edema noted)    · Usual Body Weight: (Per EMR: 228 lb 4.8 oz on 5/1/20)     · Ideal Body Weight: 190 lbs  · BMI: 28.2  · BMI Categories: Overweight (BMI 25.0-29. 9)       Nutrition Diagnosis:   · Inadequate oral intake related to inadequate protein-energy intake as evidenced by poor intake prior to admission    Nutrition Interventions:   Food and/or Nutrient Delivery:  Continue Current Diet, Vitamin Supplement(Pt declines need for ONS during LOS)  Nutrition Education/Counseling:  Education declined(Pt declines need for education during LOS)   Coordination of Nutrition Care:  Continue to monitor while inpatient    Goals:  Pt will consume 75% or more of meals during LOS       Nutrition Monitoring and Evaluation:   Behavioral-Environmental Outcomes:  None Identified   Food/Nutrient Intake Outcomes:  Food and Nutrient Intake, Vitamin/Mineral Intake  Physical Signs/Symptoms Outcomes:  Biochemical Data, Weight, Skin, Nutrition Focused Physical Findings, Fluid Status or Edema     Discharge Planning:    Continue current diet     Electronically signed by Quynh Grayson RD, LD on 2/2/21 at 1:23 PM EST    Contact: [phone]

## 2021-02-02 NOTE — PROGRESS NOTES
[facility]         Renal Inpatient Follow-Up note         2/2/2021 10:39 AM    Pt Name:   Martha Saravia  YOB: 1955  Attending:   JOSE R Hinson *    Chief Complaint : Martha Saravia is a 77 y.o. male being followed by nephrology for acute kidney injury    Interval History :   Patient seen and examined by me. No distress  Feels well denies any chest pain or shortness of breath  He is still requiring high flow oxygen, but apparently getting better down to 20% FiO2     Scheduled Medications :    amLODIPine  5 mg Oral Daily    cefTRIAXone (ROCEPHIN) IV  1,000 mg Intravenous Q24H    sodium chloride flush  10 mL Intravenous 2 times per day    dexamethasone  6 mg Oral Daily    Vitamin D  2,000 Units Oral Daily    ascorbic acid  500 mg Oral Daily    zinc sulfate  50 mg Oral Daily    insulin lispro  0-12 Units Subcutaneous TID WC    insulin lispro  0-6 Units Subcutaneous Nightly    heparin (porcine)  5,000 Units Subcutaneous Q8H      sodium chloride      sodium chloride      dextrose         Vitals :  BP (!) 174/111   Pulse 91   Temp 97.1 °F (36.2 °C) (Oral)   Resp 20   Ht 6' 2\" (1.88 m)   Wt 220 lb (99.8 kg)   SpO2 90%   BMI 28.25 kg/m²     24HR INTAKE/OUTPUT:      Intake/Output Summary (Last 24 hours) at 2/2/2021 1039  Last data filed at 2/2/2021 0806  Gross per 24 hour   Intake 990 ml   Output 2250 ml   Net -1260 ml     Last 3 weights  Wt Readings from Last 3 Encounters:   01/28/21 220 lb (99.8 kg)   05/03/20 228 lb 4.8 oz (103.6 kg)   10/02/18 205 lb (93 kg)           Physical Exam : Limited due to COVID-19  General Appearance:  Well developed.  No distress  Mouth/Throat:  Oral mucosa moist  Neck: Good movements  CNS-grossly intact  Psych-not agitated  Musculoskeletal:  no edema         Last 3 CBC   Recent Labs     01/31/21  0538 02/01/21  0556 02/02/21  0544   WBC 7.6 6.8 7.1   RBC 5.09 5.20 5.47   HGB 16.0 16.2 17.2   HCT 48.1 49.4 51.8  383 436*     Last 3 CMP  Recent Labs     01/31/21  0538 02/01/21  0556 02/02/21  0544    137 141   K 4.4  4.4 4.3  4.3 4.4  4.4   CL 98 102 103   CO2 26 28 26   BUN 67* 53* 49*   CREATININE 2.7* 2.1* 2.1*   CALCIUM 8.5 8.5 9.3   LABALBU 2.6* 2.6* 2.8*   BILITOT 0.2* 0.3 0.4             Assessment    1. Renal -acute kidney injury, most likely secondary to multifactorial etiology primarily due to the extensive use of nonsteroidal anti-inflammatories combined with ACE inhibitor. May have some component of prerenal as well. ? Does not look overtly congested  needing high flow oxygen  ? Urine lites show ATN picture urine eosinophils are negative, renal ultrasound scan normal  ? Renal function improving with hydration. However creatinine maintaining stable  ? Monitor renal function closely may even be developing a new baseline or might improve over time.     2. Electrolytes -appear to be within normal limits  3. Acid-base status-appears to be stable  4. Essential hypertension running high will start on Norvasc 5 mg p.o. daily  5. COVID-19 pneumonia  6. Hx of diabetes mellitus with nephropathy. 7. Meds reviewed and discussed with patient and nursing staff      HILLARY Callejas D.  [facility].

## 2021-02-03 ENCOUNTER — APPOINTMENT (OUTPATIENT)
Dept: GENERAL RADIOLOGY | Age: 66
DRG: 871 | End: 2021-02-03
Payer: MEDICARE

## 2021-02-03 LAB
ALBUMIN SERPL-MCNC: 2.7 G/DL (ref 3.5–5.1)
ALP BLD-CCNC: 56 U/L (ref 38–126)
ALT SERPL-CCNC: 43 U/L (ref 11–66)
ANION GAP SERPL CALCULATED.3IONS-SCNC: 8 MEQ/L (ref 8–16)
AST SERPL-CCNC: 25 U/L (ref 5–40)
BASOPHILS # BLD: 0.2 %
BASOPHILS ABSOLUTE: 0 THOU/MM3 (ref 0–0.1)
BILIRUB SERPL-MCNC: 0.4 MG/DL (ref 0.3–1.2)
BUN BLDV-MCNC: 47 MG/DL (ref 7–22)
CALCIUM SERPL-MCNC: 9.2 MG/DL (ref 8.5–10.5)
CHLORIDE BLD-SCNC: 102 MEQ/L (ref 98–111)
CO2: 30 MEQ/L (ref 23–33)
CREAT SERPL-MCNC: 1.9 MG/DL (ref 0.4–1.2)
EOSINOPHIL # BLD: 1.1 %
EOSINOPHILS ABSOLUTE: 0.1 THOU/MM3 (ref 0–0.4)
ERYTHROCYTE [DISTWIDTH] IN BLOOD BY AUTOMATED COUNT: 12.2 % (ref 11.5–14.5)
ERYTHROCYTE [DISTWIDTH] IN BLOOD BY AUTOMATED COUNT: 42.5 FL (ref 35–45)
FERRITIN: 609 NG/ML (ref 22–322)
GLUCOSE BLD-MCNC: 137 MG/DL (ref 70–108)
GLUCOSE BLD-MCNC: 153 MG/DL (ref 70–108)
GLUCOSE BLD-MCNC: 179 MG/DL (ref 70–108)
GLUCOSE BLD-MCNC: 226 MG/DL (ref 70–108)
GLUCOSE BLD-MCNC: 251 MG/DL (ref 70–108)
HCT VFR BLD CALC: 50.7 % (ref 42–52)
HEMOGLOBIN: 16.7 GM/DL (ref 14–18)
IMMATURE GRANS (ABS): 0.06 THOU/MM3 (ref 0–0.07)
IMMATURE GRANULOCYTES: 0.7 %
LYMPHOCYTES # BLD: 18 %
LYMPHOCYTES ABSOLUTE: 1.4 THOU/MM3 (ref 1–4.8)
MCH RBC QN AUTO: 31.2 PG (ref 26–33)
MCHC RBC AUTO-ENTMCNC: 32.9 GM/DL (ref 32.2–35.5)
MCV RBC AUTO: 94.8 FL (ref 80–94)
MONOCYTES # BLD: 14 %
MONOCYTES ABSOLUTE: 1.1 THOU/MM3 (ref 0.4–1.3)
NUCLEATED RED BLOOD CELLS: 0 /100 WBC
PLATELET # BLD: 439 THOU/MM3 (ref 130–400)
PMV BLD AUTO: 11.5 FL (ref 9.4–12.4)
POTASSIUM REFLEX MAGNESIUM: 4.5 MEQ/L (ref 3.5–5.2)
POTASSIUM SERPL-SCNC: 4.5 MEQ/L (ref 3.5–5.2)
RBC # BLD: 5.35 MILL/MM3 (ref 4.7–6.1)
SEG NEUTROPHILS: 66 %
SEGMENTED NEUTROPHILS ABSOLUTE COUNT: 5.3 THOU/MM3 (ref 1.8–7.7)
SODIUM BLD-SCNC: 140 MEQ/L (ref 135–145)
TOTAL PROTEIN: 6.5 G/DL (ref 6.1–8)
WBC # BLD: 8 THOU/MM3 (ref 4.8–10.8)

## 2021-02-03 PROCEDURE — 2580000003 HC RX 258: Performed by: NURSE PRACTITIONER

## 2021-02-03 PROCEDURE — 94761 N-INVAS EAR/PLS OXIMETRY MLT: CPT

## 2021-02-03 PROCEDURE — 2700000000 HC OXYGEN THERAPY PER DAY

## 2021-02-03 PROCEDURE — 6360000002 HC RX W HCPCS: Performed by: NURSE PRACTITIONER

## 2021-02-03 PROCEDURE — 6370000000 HC RX 637 (ALT 250 FOR IP): Performed by: INTERNAL MEDICINE

## 2021-02-03 PROCEDURE — 99232 SBSQ HOSP IP/OBS MODERATE 35: CPT | Performed by: INTERNAL MEDICINE

## 2021-02-03 PROCEDURE — 36415 COLL VENOUS BLD VENIPUNCTURE: CPT

## 2021-02-03 PROCEDURE — 2060000000 HC ICU INTERMEDIATE R&B

## 2021-02-03 PROCEDURE — 97530 THERAPEUTIC ACTIVITIES: CPT

## 2021-02-03 PROCEDURE — 6370000000 HC RX 637 (ALT 250 FOR IP): Performed by: NURSE PRACTITIONER

## 2021-02-03 PROCEDURE — 97110 THERAPEUTIC EXERCISES: CPT

## 2021-02-03 PROCEDURE — 71045 X-RAY EXAM CHEST 1 VIEW: CPT

## 2021-02-03 PROCEDURE — 82948 REAGENT STRIP/BLOOD GLUCOSE: CPT

## 2021-02-03 PROCEDURE — 6370000000 HC RX 637 (ALT 250 FOR IP): Performed by: PHYSICIAN ASSISTANT

## 2021-02-03 PROCEDURE — 97116 GAIT TRAINING THERAPY: CPT

## 2021-02-03 PROCEDURE — 80053 COMPREHEN METABOLIC PANEL: CPT

## 2021-02-03 PROCEDURE — 99233 SBSQ HOSP IP/OBS HIGH 50: CPT | Performed by: INTERNAL MEDICINE

## 2021-02-03 PROCEDURE — 85025 COMPLETE CBC W/AUTO DIFF WBC: CPT

## 2021-02-03 PROCEDURE — 97535 SELF CARE MNGMENT TRAINING: CPT

## 2021-02-03 PROCEDURE — 82728 ASSAY OF FERRITIN: CPT

## 2021-02-03 RX ORDER — AMLODIPINE BESYLATE 10 MG/1
10 TABLET ORAL DAILY
Status: DISCONTINUED | OUTPATIENT
Start: 2021-02-04 | End: 2021-02-04 | Stop reason: HOSPADM

## 2021-02-03 RX ADMIN — HYDRALAZINE HYDROCHLORIDE 25 MG: 25 TABLET, FILM COATED ORAL at 17:50

## 2021-02-03 RX ADMIN — CEFTRIAXONE SODIUM 1000 MG: 1 INJECTION, POWDER, FOR SOLUTION INTRAMUSCULAR; INTRAVENOUS at 12:20

## 2021-02-03 RX ADMIN — DEXAMETHASONE 6 MG: 4 TABLET ORAL at 09:00

## 2021-02-03 RX ADMIN — HEPARIN SODIUM 5000 UNITS: 5000 INJECTION INTRAVENOUS; SUBCUTANEOUS at 17:56

## 2021-02-03 RX ADMIN — GUAIFENESIN AND DEXTROMETHORPHAN 5 ML: 100; 10 SYRUP ORAL at 03:09

## 2021-02-03 RX ADMIN — GUAIFENESIN AND DEXTROMETHORPHAN 5 ML: 100; 10 SYRUP ORAL at 09:00

## 2021-02-03 RX ADMIN — HEPARIN SODIUM 5000 UNITS: 5000 INJECTION INTRAVENOUS; SUBCUTANEOUS at 10:14

## 2021-02-03 RX ADMIN — INSULIN LISPRO 2 UNITS: 100 INJECTION, SOLUTION INTRAVENOUS; SUBCUTANEOUS at 12:40

## 2021-02-03 RX ADMIN — SODIUM CHLORIDE, PRESERVATIVE FREE 10 ML: 5 INJECTION INTRAVENOUS at 09:04

## 2021-02-03 RX ADMIN — HYDRALAZINE HYDROCHLORIDE 25 MG: 25 TABLET, FILM COATED ORAL at 03:09

## 2021-02-03 RX ADMIN — SODIUM CHLORIDE, PRESERVATIVE FREE 10 ML: 5 INJECTION INTRAVENOUS at 20:04

## 2021-02-03 RX ADMIN — AMLODIPINE BESYLATE 5 MG: 5 TABLET ORAL at 09:02

## 2021-02-03 RX ADMIN — HEPARIN SODIUM 5000 UNITS: 5000 INJECTION INTRAVENOUS; SUBCUTANEOUS at 01:03

## 2021-02-03 RX ADMIN — INSULIN LISPRO 4 UNITS: 100 INJECTION, SOLUTION INTRAVENOUS; SUBCUTANEOUS at 17:57

## 2021-02-03 RX ADMIN — OXYCODONE HYDROCHLORIDE AND ACETAMINOPHEN 500 MG: 500 TABLET ORAL at 09:01

## 2021-02-03 RX ADMIN — HYDRALAZINE HYDROCHLORIDE 25 MG: 25 TABLET, FILM COATED ORAL at 09:00

## 2021-02-03 RX ADMIN — Medication 2000 UNITS: at 09:00

## 2021-02-03 RX ADMIN — Medication 50 MG: at 09:01

## 2021-02-03 ASSESSMENT — PAIN SCALES - GENERAL
PAINLEVEL_OUTOF10: 0
PAINLEVEL_OUTOF10: 0

## 2021-02-03 NOTE — PROGRESS NOTES
[facility]         Renal Inpatient Follow-Up note         2/3/2021 8:00 AM    Pt Name:   Rogelio Chaney  YOB: 1955  Attending:   Johnnie Hunter MD    Chief Complaint : Rogelio Chaney is a 77 y.o. male being followed by nephrology for acute kidney injury    Interval History :   Patient seen and examined by me. No distress  Feels well denies any chest pain or shortness of breath  Patient currently on nasal cannula     Scheduled Medications :    amLODIPine  5 mg Oral Daily    cefTRIAXone (ROCEPHIN) IV  1,000 mg Intravenous Q24H    sodium chloride flush  10 mL Intravenous 2 times per day    dexamethasone  6 mg Oral Daily    Vitamin D  2,000 Units Oral Daily    ascorbic acid  500 mg Oral Daily    zinc sulfate  50 mg Oral Daily    insulin lispro  0-12 Units Subcutaneous TID WC    insulin lispro  0-6 Units Subcutaneous Nightly    heparin (porcine)  5,000 Units Subcutaneous Q8H      sodium chloride      sodium chloride      dextrose         Vitals :  BP (!) 146/109   Pulse 95   Temp 98.3 °F (36.8 °C) (Oral)   Resp 20   Ht 6' 2\" (1.88 m)   Wt 220 lb (99.8 kg)   SpO2 94%   BMI 28.25 kg/m²     24HR INTAKE/OUTPUT:      Intake/Output Summary (Last 24 hours) at 2/3/2021 0800  Last data filed at 2/3/2021 0306  Gross per 24 hour   Intake 970 ml   Output 3800 ml   Net -2830 ml     Last 3 weights  Wt Readings from Last 3 Encounters:   01/28/21 220 lb (99.8 kg)   05/03/20 228 lb 4.8 oz (103.6 kg)   10/02/18 205 lb (93 kg)           Physical Exam : Limited due to COVID-19  General Appearance:  Well developed.  No distress  Mouth/Throat:  Oral mucosa moist  Neck: Good movements  CNS-grossly intact  Psych-not agitated  Musculoskeletal:  no edema         Last 3 CBC   Recent Labs     02/01/21  0556 02/02/21  0544 02/03/21  0528   WBC 6.8 7.1 8.0   RBC 5.20 5.47 5.35   HGB 16.2 17.2 16.7   HCT 49.4 51.8 50.7    436* 439*     Last 3 CMP  Recent Labs 02/01/21  0556 02/02/21  0544 02/03/21  0528    141 140   K 4.3  4.3 4.4  4.4 4.5  4.5    103 102   CO2 28 26 30   BUN 53* 49* 47*   CREATININE 2.1* 2.1* 1.9*   CALCIUM 8.5 9.3 9.2   LABALBU 2.6* 2.8* 2.7*   BILITOT 0.3 0.4 0.4             Assessment    1. Renal -acute kidney injury, most likely secondary to multifactorial etiology primarily due to the extensive use of nonsteroidal anti-inflammatories combined with ACE inhibitor. May have some component of prerenal as well. ? Urine lites show ATN picture urine eosinophils are negative, renal ultrasound scan normal  ? Renal function improving with hydration. Now slowly getting better currently creatinine is 1.9  ? Might take a little prolonged duration to improve. Meanwhile advised to drink slightly more liquids as well  ? Follow BMP closely     2. Electrolytes -appear to be within normal limits  3. Acid-base status-appears to be stable  4. Essential hypertension running high increase Norvasc to 10 mg p.o. daily  5. COVID-19 pneumonia  6. Hx of diabetes mellitus with nephropathy. 7. Meds reviewed and discussed with patient and nursing staff      HILLARY Mercedes D.  [facility].

## 2021-02-03 NOTE — PROGRESS NOTES
Assessment: Patient progressing toward established goals. and pt had been weaned to 4L O2 this date and he was able to tolerate increased activity   Activity Tolerance:  Patient tolerance of  treatment: good. Equipment Recommendations:Equipment Needed: (continue to assess)  Discharge Recommendations:    Continue to assess pending progress, Patient would benefit from continued therapy after discharge- anticipate home with assist PRN     Plan: Times per week: 5xGM  Times per day: Daily  Current Treatment Recommendations: Strengthening, Transfer Training, Endurance Training, Neuromuscular Re-education, Patient/Caregiver Education & Training, Balance Training, Gait Training, Home Exercise Program, Functional Mobility Training, Stair training, Safety Education & Training    Patient Education  Patient Education: Plan of Care, discussed safety concerns with home going with O2 tubing and awareness, discussed getting a pulse ox for home to monitor sats and when he needs to rest    Goals:  Patient goals : go home  Short term goals  Time Frame for Short term goals: at discharge  Short term goal 1: Patient will complete sit < > stand wtih independence to stand to ambulate safely  Short term goal 2: Patient will ambulate 100' with no AD with modified independence to navigate home safely. Short term goal 3: Patient will ascend/descend 3 steps with 1 hand rail and SBA to access/leave home. Long term goals  Time Frame for Long term goals : N/A due to short estimated length of stay    Following session, patient left in safe position with all fall risk precautions in place.

## 2021-02-03 NOTE — PROGRESS NOTES
Called and updated sister, Felicitas Garcia on patient condition. All questions answered at this time.

## 2021-02-03 NOTE — PROGRESS NOTES
[address] [place] [address]  Occupational Therapy  Daily Note  Time:   Time In: 1040  Time Out: 1110  Timed Code Treatment Minutes: 30 Minutes  Minutes: 30          Date: 2/3/2021  Patient Name: Malena Anrett,   Gender: male      [facility]  MRN: [id number]  : 1955  (77 y.o.)  Referring Practitioner: JOSE R Briones CNP  Diagnosis: COVID-19  Additional Pertinent Hx: Per H&P:66 y.o. male who presented to [facility] with chest pain and shortness of breath; has medical history includes diabetes mellitus and he quit smoking 2 to 3 weeks ago; states for the past week he has had intermittent chest tightness along with lightheadedness and dizziness, occasional cough, shortness of breath, nausea and decreased oral intake; also relates to feeling quite fatigued; symptoms continuedso he presented to the emergency department for evaluation; upon initial assessment his O2 sat was 68% so he was placed on high flow with rapid improvement; he was tachypneic initially at 39 with a heart rate of 117 and since has improved to 28; he was found to be Covid positive; he is alert and oriented and speaking in complete sentences without any distress at my time of evaluation; he denies any significant pain; unsure of his Covid contact; denies any loss of taste or smell; he is being admitted to the hospital service for further care and evaluation. Restrictions/Precautions:  Restrictions/Precautions: Fall Risk, Isolation  Position Activity Restriction  Other position/activity restrictions: high flow --flow rate 50 L/min and FiO2 60--  50L at 45%--- /3 4L O2     SUBJECTIVE: Nurse Roselia baez session, in bed upon arrival, agreeable to OT session    Patient on 4 L O2 via Nasal Canula  upon arrival to room. Patient O2 sats at 95 %. Upon activity patient maintaining O2 at 92 %. Pt requiring minimal rest break(s) to recover.        PAIN: 0/10:     COGNITION: WFL    ADL: Grooming: Stand By Assistance. Stood sinkside for brushing teeth  Toileting: Stand By Assistance. Rodo Acosta BALANCE:  Standing Balance: Stand By Assistance. approx 4 minutes with 0 UE support    BED MOBILITY:  Supine to Sit: Stand By Assistance HOB elevated, used bedrail  Sit to Supine: Stand By Assistance      TRANSFERS:  Sit to Stand:  Contact Guard Assistance. EOB  Stand to Sit: Contact Guard Assistance. FUNCTIONAL MOBILITY:  Assistive Device: None  Assist Level:  Contact Guard Assistance. Distance: To and from bathroom  No LOB     ADDITIONAL ACTIVITIES:  Guided patient through BUE AROM this date x10 reps x1 set at EOB in order to increase BUE strength and improve activity tolerance for ADLs and homemaking tasks. ASSESSMENT:     Activity Tolerance:  Patient tolerance of  treatment: good.        Discharge Recommendations: Continue to assess pending progress   Equipment Recommendations: Equipment Needed: No  Plan: Times per week: 5x  Times per day: Daily  Current Treatment Recommendations: Strengthening, Balance Training, Functional Mobility Training, Endurance Training, Patient/Caregiver Education & Training, Safety Education & Training, Self-Care / ADL, Home Management Training    Patient Education  Patient Education: ADL's and Home Exercise Program    Goals  Short term goals  Time Frame for Short term goals: by d/c  Short term goal 1: Pt will complete functional mobility to/from BR with SBA, maintaining O2 >90% and no LOB to increase indep with ADL routine  Short term goal 2: Pt will tolerate dynamic standing x4 min with Sup and B hand release to increase indep with groomign  Short term goal 3: Pt will complete various t/fs with Sup and no safety cues to increase indep with toileting  Short term goal 4: Pt will complete BADL routine with no > than SBA to increase indep with bathing  Long term goals  Time Frame for Long term goals : no LTG d/t short ELOS Following session, patient left in safe position with all fall risk precautions in place.

## 2021-02-03 NOTE — CARE COORDINATION
DISASTER CHARTING    2/3/21, 7:42 AM EST    DISCHARGE ONGOING EVALUATION:     Lexus Lloyd day: 6  Location: 8A-11/011-A Reason for admit: COVID-19 virus detected [U07.1]   Barriers to Discharge: No fever overnight. Weaned from HF to NC 5L at present with O2 sat 94%. Rocephin, Decadron, Vits. Creat today 1.9. Nephrology, Dietitian, PT/Ot following. PCP: Piotr Miguel MD  Readmission Risk Score: 14%  Patient Goals/Plan/Treatment Preferences: Plans to return home with room mate. Current [facility] for home O2. Cont to follow.

## 2021-02-03 NOTE — PROGRESS NOTES
Hospitalist Progress Note    Patient:  Jam Gray      Unit/Bed:8A-11/011-A    YOB: 1955    MRN: [id number]       Acct: [de-identified]     PCP: Jaswinder Álvarez MD    Date of Admission: 1/28/2021    Assessment/Plan:    1. COVID-19 PNA: positive test on 1/28; Decadron 1/28, remdesivir 1/28 x 1 dose and holding secondary to #5, convalescent plasma on 1/28, 1/29, vitamin C, vitamin D and zinc, incentive spirometry and Acapella, PT and OT;   2. Bilateral pneumonia (POA) likely secondary to COVID-19, possibly bacterial component: see #1; Zithromax 1/28 x 5 days total,  Rocephin 1/29  3. Sepsis (POA) likely secondary to #1, #2: afebrile; much improved, monitor  4. Acute hypoxic respiratory failure: had an initial O2 sat of 68%; initially on HFNC, now on 4L NC. Wean as able, incentive spirometry and Acapella  5. JERMAINE: Likely prerenal and ATN 2/2 to decreased PO intake and NSAID, ACEi and cocaine use. Improving with IVF. Down to 1.9 today. Nephrology following and managing. Avoid nephrotoxic agents. Strict I/Os. Daily weights   6. Polycythemia, likely secondary to dehydration: see #5;  improved  7. Diabetes mellitus type 2, uncontrolled: holding p.o. hypoglycemics; on sliding scale level 2 along with hypoglycemia protocol; monitor  8. Essential hypertension, controlled: takes lisinopril at home however that is on hold; Norvasc started today; blood pressure is stable  9. Remote smoker quitting 2 to 3 weeks ago  10. Cocaine abuse: urine drug screen and positive; history of same May 2, 2020     Expected discharge date:  To be determined    Disposition:    [x] Home       [] TCU       [] Rehab       [] Psych       [] SNF       [] [facility]       [] Other-    Chief Complaint: Chest pain and shortness of breath Hospital Course:  77 y.o. male who presented to [address] [place] [address] with chest pain and shortness of breath; has medical history includes diabetes mellitus and he quit smoking 2 to 3 weeks ago; states for the past week he has had intermittent chest tightness along with lightheadedness and dizziness, occasional cough, shortness of breath, nausea and decreased oral intake; also relates to feeling quite fatigued; symptoms continuedso he presented to the emergency department for evaluation; upon initial assessment his O2 sat was 68% so he was placed on high flow with rapid improvement; he was tachypneic initially at 39 with a heart rate of 117 and since has improved to 28; he was found to be Covid positive; he is alert and oriented and speaking in complete sentences without any distress at my time of evaluation; he denies any significant pain; unsure of his Covid contact; denies any loss of taste or smell; he is being admitted to the hospital service for further care and evaluation. 1/29--> currently on high flow at 60% FiO2 and 50 L and satting 95%; creatinine increased to 4.1 from 3.1 so nephrology was consulted and appreciate their input; urine drug screen positive for cocaine    1/30--> on high flow oxygen at 65% FiO2 and 60 L; afebrile hemodynamically stable    1/31--> on high flow oxygen at 55% and 60 L and currently satting 92%; offers no complaints and states he is feeling better    2/1--> on high flow O2 at 45% FiO2 and 50 L and satting 93%; blood pressure up today so Norvasc was started; exam reveals some fine bibasilar crackles so I held the fluids until nephrology sees    2/2--> on high flow at 30% FiO2 and 20 L satting 94%; not in distress at this time, creatinine at 2.1    2/3--> weaned off of HFNC and down to 5L NC today. Ambulated in hallway with PT. Pt resting in bed comfortably, having cough. Denies any sob, fevers, chills, or diarrhea. Tolerating PO intake.        Medications:  Reviewed Infusion Medications    sodium chloride      sodium chloride      dextrose       Scheduled Medications    [START ON 2/4/2021] amLODIPine  10 mg Oral Daily    cefTRIAXone (ROCEPHIN) IV  1,000 mg Intravenous Q24H    sodium chloride flush  10 mL Intravenous 2 times per day    dexamethasone  6 mg Oral Daily    Vitamin D  2,000 Units Oral Daily    ascorbic acid  500 mg Oral Daily    zinc sulfate  50 mg Oral Daily    insulin lispro  0-12 Units Subcutaneous TID WC    insulin lispro  0-6 Units Subcutaneous Nightly    heparin (porcine)  5,000 Units Subcutaneous Q8H     PRN Meds: hydrALAZINE, sodium chloride, sodium chloride flush, promethazine **OR** ondansetron, polyethylene glycol, acetaminophen **OR** acetaminophen, guaiFENesin-dextromethorphan, sodium chloride, glucose, dextrose, glucagon (rDNA), dextrose, albuterol sulfate HFA, sodium chloride      Intake/Output Summary (Last 24 hours) at 2/3/2021 1455  Last data filed at 2/3/2021 1336  Gross per 24 hour   Intake 2330 ml   Output 3675 ml   Net -1345 ml       Diet:  DIET CARB CONTROL; Exam:  BP (!) 147/100   Pulse 96   Temp 97.8 °F (36.6 °C) (Oral)   Resp 18   Ht 6' 2\" (1.88 m)   Wt 220 lb (99.8 kg)   SpO2 94%   BMI 28.25 kg/m²     General appearance: No apparent distress, appears stated age and cooperative. HEENT: Pupils equal, round, and reactive to light. Conjunctivae/corneas clear. Neck: Supple, with full range of motion. No jugular venous distention. Trachea midline. Respiratory:  Normal respiratory effort. Diminished to auscultation, bilaterally; able to speak in complete sentences and not tachypneic  Cardiovascular: Regular rate and rhythm with normal S1/S2 without murmurs, rubs or gallops. Abdomen: Soft, non-tender, non-distended with normal bowel sounds. Musculoskeletal: passive and active ROM x 4 extremities.   Skin: Skin color, texture, turgor normal. Neurologic:  Neurovascularly intact without any focal sensory/motor deficits. Cranial nerves: II-XII intact, grossly non-focal.  Psychiatric: Alert and oriented x 4, thought content appropriate  Capillary Refill: Brisk,< 3 seconds   Peripheral Pulses: +2 palpable, equal bilaterally       Labs:   Recent Labs     02/01/21 0556 02/02/21 0544 02/03/21 0528   WBC 6.8 7.1 8.0   HGB 16.2 17.2 16.7   HCT 49.4 51.8 50.7    436* 439*     Recent Labs     02/01/21 0556 02/02/21 0544 02/03/21 0528    141 140   K 4.3  4.3 4.4  4.4 4.5  4.5    103 102   CO2 28 26 30   BUN 53* 49* 47*   CREATININE 2.1* 2.1* 1.9*   CALCIUM 8.5 9.3 9.2     Recent Labs     02/01/21 0556 02/02/21 0544 02/03/21 0528   AST 29 26 25   ALT 30 37 43   BILITOT 0.3 0.4 0.4   ALKPHOS 48 57 56     No results for input(s): INR in the last 72 hours. No results for input(s): Lea Reina in the last 72 hours. Recent Labs     02/01/21 0556   PROCAL 0.18*       Microbiology:    URQSH-12 positive on 1/28    Radiology:  Xr Chest Portable    Result Date: 1/28/2021  PROCEDURE: XR CHEST PORTABLE CLINICAL INFORMATION: hypoxic, covid COMPARISON: 5/2/2020 TECHNIQUE:  AP mobile chest single view  FINDINGS: There is mild to moderate enlargement of the cardiomediastinal silhouette. Bilateral interstitial opacities are present which may be related to edema. However there is more focal patchy consolidative airspace disease at the bilateral right greater than left lung bases which may represent underlying pneumonia. No pneumothorax or  pleural effusion is seen. No acute osseous findings are demonstrated. 1. Bilateral diffuse interstitial opacities are present which may represent edema and/or pneumonia. However there are more focal consolidative opacities at the right greater than left lung bases which likely represents underlying pneumonia. Recommend continued follow-up. 2. Mild to moderate enlargement of the cardiomediastinal silhouette is noted. **This report has been created using voice recognition software. It may contain minor errors which are inherent in voice recognition technology. ** Final report electronically signed by Dr. Marbin Nieves on 1/28/2021 10:30 AM      DVT prophylaxis: [] Lovenox                                 [] SCDs                                 [x] SQ Heparin                                 [] Encourage ambulation           [] Already on Anticoagulation     Code Status: Full Code    Tele:   [] yes               [x] no    Active Hospital Problems    Diagnosis Date Noted    COVID-19 virus detected [U07.1] 01/28/2021       Electronically signed by Nakul Mcconnell MD on 2/3/2021 at 2:55 PM

## 2021-02-04 VITALS
RESPIRATION RATE: 18 BRPM | BODY MASS INDEX: 28.23 KG/M2 | SYSTOLIC BLOOD PRESSURE: 148 MMHG | TEMPERATURE: 97.9 F | DIASTOLIC BLOOD PRESSURE: 107 MMHG | OXYGEN SATURATION: 91 % | HEART RATE: 110 BPM | WEIGHT: 220 LBS | HEIGHT: 74 IN

## 2021-02-04 LAB
ALBUMIN SERPL-MCNC: 2.6 G/DL (ref 3.5–5.1)
ALP BLD-CCNC: 59 U/L (ref 38–126)
ALT SERPL-CCNC: 50 U/L (ref 11–66)
ANION GAP SERPL CALCULATED.3IONS-SCNC: 7 MEQ/L (ref 8–16)
AST SERPL-CCNC: 25 U/L (ref 5–40)
BASOPHILS # BLD: 0.2 %
BASOPHILS ABSOLUTE: 0 THOU/MM3 (ref 0–0.1)
BILIRUB SERPL-MCNC: 0.4 MG/DL (ref 0.3–1.2)
BUN BLDV-MCNC: 38 MG/DL (ref 7–22)
CALCIUM SERPL-MCNC: 9.3 MG/DL (ref 8.5–10.5)
CHLORIDE BLD-SCNC: 103 MEQ/L (ref 98–111)
CO2: 29 MEQ/L (ref 23–33)
CREAT SERPL-MCNC: 1.6 MG/DL (ref 0.4–1.2)
EOSINOPHIL # BLD: 1.9 %
EOSINOPHILS ABSOLUTE: 0.2 THOU/MM3 (ref 0–0.4)
ERYTHROCYTE [DISTWIDTH] IN BLOOD BY AUTOMATED COUNT: 12.1 % (ref 11.5–14.5)
ERYTHROCYTE [DISTWIDTH] IN BLOOD BY AUTOMATED COUNT: 43 FL (ref 35–45)
FERRITIN: 424 NG/ML (ref 22–322)
GLUCOSE BLD-MCNC: 111 MG/DL (ref 70–108)
GLUCOSE BLD-MCNC: 133 MG/DL (ref 70–108)
GLUCOSE BLD-MCNC: 177 MG/DL (ref 70–108)
HCT VFR BLD CALC: 52.4 % (ref 42–52)
HEMOGLOBIN: 17.1 GM/DL (ref 14–18)
IMMATURE GRANS (ABS): 0.07 THOU/MM3 (ref 0–0.07)
IMMATURE GRANULOCYTES: 0.8 %
LYMPHOCYTES # BLD: 20 %
LYMPHOCYTES ABSOLUTE: 1.7 THOU/MM3 (ref 1–4.8)
MCH RBC QN AUTO: 31.3 PG (ref 26–33)
MCHC RBC AUTO-ENTMCNC: 32.6 GM/DL (ref 32.2–35.5)
MCV RBC AUTO: 95.8 FL (ref 80–94)
MONOCYTES # BLD: 12.6 %
MONOCYTES ABSOLUTE: 1.1 THOU/MM3 (ref 0.4–1.3)
NUCLEATED RED BLOOD CELLS: 0 /100 WBC
PLATELET # BLD: 426 THOU/MM3 (ref 130–400)
PMV BLD AUTO: 11.3 FL (ref 9.4–12.4)
POTASSIUM REFLEX MAGNESIUM: 4.5 MEQ/L (ref 3.5–5.2)
RBC # BLD: 5.47 MILL/MM3 (ref 4.7–6.1)
SEG NEUTROPHILS: 64.5 %
SEGMENTED NEUTROPHILS ABSOLUTE COUNT: 5.5 THOU/MM3 (ref 1.8–7.7)
SODIUM BLD-SCNC: 139 MEQ/L (ref 135–145)
TOTAL PROTEIN: 6.6 G/DL (ref 6.1–8)
WBC # BLD: 8.6 THOU/MM3 (ref 4.8–10.8)

## 2021-02-04 PROCEDURE — 2580000003 HC RX 258: Performed by: NURSE PRACTITIONER

## 2021-02-04 PROCEDURE — 99239 HOSP IP/OBS DSCHRG MGMT >30: CPT | Performed by: INTERNAL MEDICINE

## 2021-02-04 PROCEDURE — 6370000000 HC RX 637 (ALT 250 FOR IP): Performed by: INTERNAL MEDICINE

## 2021-02-04 PROCEDURE — 82728 ASSAY OF FERRITIN: CPT

## 2021-02-04 PROCEDURE — 99232 SBSQ HOSP IP/OBS MODERATE 35: CPT | Performed by: INTERNAL MEDICINE

## 2021-02-04 PROCEDURE — 6360000002 HC RX W HCPCS: Performed by: NURSE PRACTITIONER

## 2021-02-04 PROCEDURE — 6370000000 HC RX 637 (ALT 250 FOR IP): Performed by: NURSE PRACTITIONER

## 2021-02-04 PROCEDURE — 85025 COMPLETE CBC W/AUTO DIFF WBC: CPT

## 2021-02-04 PROCEDURE — 6370000000 HC RX 637 (ALT 250 FOR IP): Performed by: PHYSICIAN ASSISTANT

## 2021-02-04 PROCEDURE — 94761 N-INVAS EAR/PLS OXIMETRY MLT: CPT

## 2021-02-04 PROCEDURE — 97116 GAIT TRAINING THERAPY: CPT

## 2021-02-04 PROCEDURE — 97110 THERAPEUTIC EXERCISES: CPT

## 2021-02-04 PROCEDURE — 36415 COLL VENOUS BLD VENIPUNCTURE: CPT

## 2021-02-04 PROCEDURE — 82948 REAGENT STRIP/BLOOD GLUCOSE: CPT

## 2021-02-04 PROCEDURE — 80053 COMPREHEN METABOLIC PANEL: CPT

## 2021-02-04 RX ORDER — DEXAMETHASONE 6 MG/1
6 TABLET ORAL DAILY
Qty: 2 TABLET | Refills: 0 | Status: SHIPPED | OUTPATIENT
Start: 2021-02-05 | End: 2021-02-07

## 2021-02-04 RX ORDER — ZINC SULFATE 50(220)MG
50 CAPSULE ORAL DAILY
Qty: 30 CAPSULE | Refills: 0 | COMMUNITY
Start: 2021-02-05

## 2021-02-04 RX ORDER — ASCORBIC ACID 500 MG
500 TABLET ORAL DAILY
Qty: 30 TABLET | Refills: 0 | Status: SHIPPED | OUTPATIENT
Start: 2021-02-05

## 2021-02-04 RX ORDER — ALBUTEROL SULFATE 90 UG/1
2 AEROSOL, METERED RESPIRATORY (INHALATION) EVERY 6 HOURS PRN
Qty: 1 INHALER | Refills: 0 | Status: SHIPPED | OUTPATIENT
Start: 2021-02-04

## 2021-02-04 RX ORDER — HYDRALAZINE HYDROCHLORIDE 25 MG/1
25 TABLET, FILM COATED ORAL EVERY 8 HOURS PRN
Qty: 90 TABLET | Refills: 0 | Status: SHIPPED | OUTPATIENT
Start: 2021-02-04

## 2021-02-04 RX ORDER — AMLODIPINE BESYLATE 10 MG/1
10 TABLET ORAL DAILY
Qty: 30 TABLET | Refills: 0 | Status: SHIPPED | OUTPATIENT
Start: 2021-02-05

## 2021-02-04 RX ORDER — CHOLECALCIFEROL (VITAMIN D3) 50 MCG
2000 TABLET ORAL DAILY
Qty: 60 TABLET | Refills: 0 | Status: SHIPPED | OUTPATIENT
Start: 2021-02-05

## 2021-02-04 RX ADMIN — Medication 50 MG: at 09:14

## 2021-02-04 RX ADMIN — HYDRALAZINE HYDROCHLORIDE 25 MG: 25 TABLET, FILM COATED ORAL at 04:20

## 2021-02-04 RX ADMIN — SODIUM CHLORIDE, PRESERVATIVE FREE 10 ML: 5 INJECTION INTRAVENOUS at 09:16

## 2021-02-04 RX ADMIN — AMLODIPINE BESYLATE 10 MG: 10 TABLET ORAL at 09:15

## 2021-02-04 RX ADMIN — Medication 2000 UNITS: at 09:14

## 2021-02-04 RX ADMIN — OXYCODONE HYDROCHLORIDE AND ACETAMINOPHEN 500 MG: 500 TABLET ORAL at 09:15

## 2021-02-04 RX ADMIN — HEPARIN SODIUM 5000 UNITS: 5000 INJECTION INTRAVENOUS; SUBCUTANEOUS at 10:28

## 2021-02-04 RX ADMIN — HEPARIN SODIUM 5000 UNITS: 5000 INJECTION INTRAVENOUS; SUBCUTANEOUS at 00:39

## 2021-02-04 RX ADMIN — DEXAMETHASONE 6 MG: 4 TABLET ORAL at 09:15

## 2021-02-04 NOTE — PROGRESS NOTES
Called and updated sister Delmar Night on patients condition. Answered all questions at this time. Gianluca Juárez states she will be the  ride for brother at time of discharge.

## 2021-02-04 NOTE — CARE COORDINATION
2/4/21, 12:02 PM EST    Patient goals/plan/ treatment preferences discussed by  and . Patient goals/plan/ treatment preferences reviewed with patient/ family. Patient/ family verbalize understanding of discharge plan and are in agreement with goal/plan/treatment preferences. Understanding was demonstrated using the teach back method. AVS provided by RN at time of discharge, which includes all necessary medical information pertaining to the patients current course of illness, treatment, post-discharge goals of care, and treatment preferences. IMM Letter  IMM Letter given to Patient/Family/Significant other/Guardian/POA/by[de-identified] Jamal Contreras RN Charge RN on 8AB. IMM Letter date given[de-identified] 02/04/21  IMM Letter time given[de-identified] 1107       Pt discharge order in place. Pt qualified for home O2 and prefers [facility]. Sharyn Fee in the In house DME notified. No other discharge needs voiced. Plans return home with room mate today. Notified Bia MORTON per voicemail that pt discharged home today.

## 2021-02-04 NOTE — PROGRESS NOTES
A home oxygen evaluation has been completed. [x]Patient is an inpatient. It is expected that the patient will be discharged within the next 48 hours. Qualified provider to write order for home prescription if patient qualifies. Social service/care managers will arrange for home oxygen. If patient is active, arrange for Home Medical supplier to assess for Oxygen Conserving Device per pulse oximetry. []Patient is an outpatient. Results will be faxed to the ordering provider. Qualified provider to write order for home prescription if patient qualifies and arranges for home oxygen. Patient was placed on room air for 5 minutes. SpO2 was 87 % on room air at rest. Patients SpO2 was below 89% and qualified for home oxygen. Oxygen was applied at 2 lpm via nasal cannula to maintain a SpO2 between 90-92% while at rest. Actual SpO2 was 92 %. Patient can ambulate for exercise flow rate. Patients was ambulated, SpO2 was 90% on 4 lpm to maintain SpO2 between 90-92% while exercising. Note: For any SpO2 at 27% see policy and procedure for possible qualifications.

## 2021-02-04 NOTE — PROGRESS NOTES
[address] [place] [address]  INPATIENT PHYSICAL THERAPY  DAILY NOTE  [facility] MED SURG 8A - 8A-11/011-A  Time In: 2254  Time Out: 5253  Timed Code Treatment Minutes: 25 Minutes  Minutes: 25          Date: 2021  Patient Name: Day Burns,  Gender:  male        MRN: [id number]  : 1955  (77 y.o.)  Referral Date : 21  Referring Practitioner: JOSE R Zamarripa CNP  Diagnosis: COVID-19 virus detected  Additional Pertinent Hx: Per H&P:66 y.o. male who presented to [address] [place] [address] with chest pain and shortness of breath; has medical history includes diabetes mellitus and he quit smoking 2 to 3 weeks ago; states for the past week he has had intermittent chest tightness along with lightheadedness and dizziness, occasional cough, shortness of breath, nausea and decreased oral intake; also relates to feeling quite fatigued; symptoms continuedso he presented to the emergency department for evaluation; upon initial assessment his O2 sat was 68% so he was placed on high flow with rapid improvement; he was tachypneic initially at 39 with a heart rate of 117 and since has improved to 28; he was found to be Covid positive; he is alert and oriented and speaking in complete sentences without any distress at my time of evaluation; he denies any significant pain; unsure of his Covid contact; denies any loss of taste or smell; he is being admitted to the hospital service for further care and evaluation.      Prior Level of Function:  Lives With: Other (comment)  Type of Home: Apartment  Home Layout: One level  Home Access: Stairs to enter with rails  Entrance Stairs - Number of Steps: 3  Entrance Stairs - Rails: Left  Home Equipment: (none)   Bathroom Shower/Tub: Tub only  Bathroom Toilet: Standard    ADL Assistance: [address] [place] [address] Avenue: Independent  Homemaking Responsibilities: Yes  Ambulation Assistance: Independent  Transfer Assistance: Independent Active : (pt reports had license taken away)  Additional Comments: Patient reports indepenent at [facility] with no AD    Restrictions/Precautions:  Restrictions/Precautions: Fall Risk, Isolation  Position Activity Restriction  Other position/activity restrictions: high flow 1/29--flow rate 50 L/min and FiO2 60-- 2/1 50L at 45%--- 2/3 4L O2, 2-3L O2 2/4     SUBJECTIVE: Patient in room sitting edge of bed, agreeable to PT. RN okay with PT session. Pt cooperative, wanting to go home. PAIN: denies    OBJECTIVE:  Bed Mobility:  Not Tested    Transfers:  Sit to Stand: Supervision  Stand to Sit:Supervision   Good O2 line awareness, impulsive at times    Ambulation:  Stand By Assistance  Distance: 160'x2  Surface: Level Tile  Device:No Device  Gait Deviations:  Slow Chacha, Decreased Arm Swing, Decreased Gait Speed and Decreased Heel Strike Bilaterally  -Verbal cues for arm swing  -Education on ambulating in halls with staff to increase mobility  -Education on O2 line management at home  -Recommended pulse ox with return home    Balance:  Static Standing Balance: Stand By Assistance  Dynamic Standing Balance: Stand By Assistance, Contact Guard Assistance    Exercise:  Patient was guided in 1 set(s) 10 reps of exercise to both lower extremities. Standing: march, hip flexion, hip abduction, hamstring curls, heel raise, forward and side step taps all completed with 1 to no UE support- some instability with no UE support but able to correct loss of balance. Exercises were completed for increased independence with functional mobility. Functional Outcome Measures: Completed  AM-PAC Inpatient Mobility Raw Score : 19  AM-PAC Inpatient T-Scale Score : 45.44    ASSESSMENT:  Assessment: Patient progressing toward established goals. Activity Tolerance:  Patient tolerance of  treatment: good. O2 89-92% with activity on 3L, on 2L at rest and O2 94-95%. Verbal cues for pursed lip breathing. Equipment Recommendations:Equipment Needed: (continue to assess)  Discharge Recommendations: Continue to assess pending progress, Patient would benefit from continued therapy after discharge    Plan: Times per week: 5xGM  Times per day: Daily  Current Treatment Recommendations: Strengthening, Transfer Training, Endurance Training, Neuromuscular Re-education, Patient/Caregiver Education & Training, Balance Training, Gait Training, Home Exercise Program, Functional Mobility Training, Stair training, Safety Education & Training    Patient Education  Patient Education: Gait, Verbal Exercise Instruction, Home Safety Education,  - Patient Verbalized Understanding, - Patient Requires Continued Education    Goals:  Patient goals : go home  Short term goals  Time Frame for Short term goals: at discharge  Short term goal 1: Patient will complete sit < > stand wtih independence to stand to ambulate safely  Short term goal 2: Patient will ambulate 100' with no AD with modified independence to navigate home safely. Short term goal 3: Patient will ascend/descend 3 steps with 1 hand rail and SBA to access/leave home. Long term goals  Time Frame for Long term goals : N/A due to short estimated length of stay    Following session, patient left in safe position with all fall risk precautions in place.

## 2021-02-04 NOTE — PROGRESS NOTES
Writer RN [address] [place] [address] to update on patients blood pressure. Told to monitor patient, no other orders given. Patient resting well.

## 2021-02-04 NOTE — PROGRESS NOTES
[facility]         Renal Inpatient Follow-Up note         2/4/2021 7:36 AM    Pt Name:   Corey Hernandez  YOB: 1955  Attending:   Denise Stanley MD    Chief Complaint : Corey Hernandez is a 77 y.o. male being followed by nephrology for acute kidney injury    Interval History :   Patient seen and examined by me. No distress  Feels well denies any chest pain or shortness of breath  Patient currently on nasal cannula, only needing 2 L of oxygen but saturating only 90%     Scheduled Medications :    amLODIPine  10 mg Oral Daily    cefTRIAXone (ROCEPHIN) IV  1,000 mg Intravenous Q24H    sodium chloride flush  10 mL Intravenous 2 times per day    dexamethasone  6 mg Oral Daily    Vitamin D  2,000 Units Oral Daily    ascorbic acid  500 mg Oral Daily    zinc sulfate  50 mg Oral Daily    insulin lispro  0-12 Units Subcutaneous TID WC    insulin lispro  0-6 Units Subcutaneous Nightly    heparin (porcine)  5,000 Units Subcutaneous Q8H      sodium chloride      sodium chloride      dextrose         Vitals :  BP (!) 144/96   Pulse 80   Temp 97.9 °F (36.6 °C) (Oral)   Resp 18   Ht 6' 2\" (1.88 m)   Wt 220 lb (99.8 kg)   SpO2 90%   BMI 28.25 kg/m²     24HR INTAKE/OUTPUT:      Intake/Output Summary (Last 24 hours) at 2/4/2021 0736  Last data filed at 2/4/2021 0410  Gross per 24 hour   Intake 2030 ml   Output 1625 ml   Net 405 ml     Last 3 weights  Wt Readings from Last 3 Encounters:   01/28/21 220 lb (99.8 kg)   05/03/20 228 lb 4.8 oz (103.6 kg)   10/02/18 205 lb (93 kg)           Physical Exam : Limited due to COVID-19  General Appearance:  Well developed.  No distress  Mouth/Throat:  Oral mucosa moist  Neck: Good movements  CNS-grossly intact  Psych-not agitated  Musculoskeletal:  no edema         Last 3 CBC   Recent Labs     02/02/21  0544 02/03/21  0528 02/04/21  0607   WBC 7.1 8.0 8.6   RBC 5.47 5.35 5.47   HGB 17.2 16.7 17.1   HCT 51.8 50.7 52.4* * 439* 426*     Last 3 CMP  Recent Labs     02/02/21  0544 02/03/21  0528 02/04/21  0607    140 139   K 4.4  4.4 4.5  4.5 4.5    102 103   CO2 26 30 29   BUN 49* 47* 38*   CREATININE 2.1* 1.9* 1.6*   CALCIUM 9.3 9.2 9.3   LABALBU 2.8* 2.7* 2.6*   BILITOT 0.4 0.4 0.4             Assessment    1. Renal -acute kidney injury, most likely secondary to multifactorial etiology primarily due to the extensive use of nonsteroidal anti-inflammatories combined with ACE inhibitor. May have some component of prerenal as well. ? Urine lites show ATN picture urine eosinophils are negative, renal ultrasound scan normal  ? Renal function improving with hydration. Now slowly getting better currently creatinine is 1.6  ? Might take a little prolonged duration to improve. Meanwhile advised to drink slightly more liquids as well we will have to see where the creatinine stabilizes  ? Follow BMP closely     2. Electrolytes -appear to be within normal limits  3. Acid-base status-appears to be stable  4. Essential hypertension increase Norvasc to 10 mg p.o. daily currently stable  5. COVID-19 pneumonia  6. Hx of diabetes mellitus with nephropathy. 7. Meds reviewed and discussed with patient     If patient is discharged today please have him follow-up in my office in 3 to 4 weeks with a BMP prior hold ACE inhibitor upon discharge      HILLARY Marie D.  [facility].

## 2021-02-04 NOTE — DISCHARGE SUMMARY
Hospital Medicine Discharge Summary      Patient Identification:   Felicity Curling   : 1955  MRN: [id number]   Account: [de-identified]      Patient's PCP: Fior Koch MD    Admit Date: 2021     Discharge Date:   21    Admitting Physician: Callie Garza DO     Discharge Physician: My Way MD     Discharge Diagnoses: Acute hypoxic respiratory failure 2/2 COVID-19 PNA; JERMAINE; Cocaine Abuse     Active Hospital Problems    Diagnosis Date Noted    COVID-19 virus detected [U07.1] 2021       The patient was seen and examined on day of discharge and this discharge summary is in conjunction with any daily progress note from day of discharge. Hospital Course:   Felicity Curling is a 77 y.o. male admitted to [address] [place] [address] on 2021 for chest pain and sob. States for the past week he has had intermittent chest tightness along with lightheadedness and dizziness, occasional cough, shortness of breath, nausea and decreased oral intake; also relates to feeling quite fatigued. Upon arrival to ED, pt hypoxic 68%, tachycardic, tachypneic at 39. Tested + COVID-19. CXR showing bilateral diffuse opacities. Also had cocaine in urine. Cr elevated at 4.1. Pt started on HFNC. Started on IVF therapy, received 2U plasma, Decadron, IV ABx. No remdisvir given kidney function. Pt improved, Cr down to 1.6. Nephrology ok for discharge with close follow-up with them in 3-4 weeks. Pt qualified for home O2 (2L rest and 4L activity). Pt to follow-up with PCP as well. Patient was evaluated today for the diagnosis of COVID-19 PNA . I entered a DME order for home oxygen because the diagnosis and testing requires the patient to have supplemental oxygen. Condition will improve or be benefited by oxygen use. The patient is  able to perform good mobility in a home setting and therefore does require the use of a portable oxygen system. The need for this equipment was discussed with the patient and he understands and is in agreement. Assessment/Plan:     1. COVID-19 PNA: positive test on 1/28; Decadron 1/28,  convalescent plasma on 1/28, 1/29, vitamin C, vitamin D and zinc, incentive spirometry and Acapella, PT and OT. 2. Bilateral pneumonia (POA) likely secondary to COVID-19, possibly bacterial component: completed treatment with IV Rocephin and Azithro. 3. Sepsis (POA) likely secondary to #1, #2: afebrile; much improved, monitor  4. Acute hypoxic respiratory failure: had an initial O2 sat of 68%; initially on HFNC, now on 2L NC. Wean as able, incentive spirometry and Acapella  5. JERMAINE: Likely prerenal and ATN 2/2 to decreased PO intake and NSAID, ACEi and cocaine use. Improving with IVF. Down to 1.6 today. Nephrology following and managing. Avoid nephrotoxic agents. Strict I/Os. Daily weights   6. Polycythemia, likely secondary to dehydration: see #5;  improved  7. Diabetes mellitus type 2, uncontrolled: holding p.o. hypoglycemics; on sliding scale level 2 along with hypoglycemia protocol; monitor  8. Essential hypertension, controlled: stop Lisinopril at discharge. Discharge on Norvasc and Hydralazine. 9. Remote smoker quitting 2 to 3 weeks ago  10.  Cocaine abuse: urine drug screen and positive      Exam:     Vitals:  Vitals:    02/04/21 0415 02/04/21 0430 02/04/21 0642 02/04/21 1107   BP: (!) 160/106  (!) 144/96    Pulse: 80      Resp:       Temp:       TempSrc:       SpO2: 92% 90%  92%   Weight:       Height:         Weight: Weight: 220 lb (99.8 kg) 24 hour intake/output:    Intake/Output Summary (Last 24 hours) at 2/4/2021 1121  Last data filed at 2/4/2021 0410  Gross per 24 hour   Intake 1430 ml   Output 1625 ml   Net -195 ml         Labs: For convenience and continuity at follow-up the following most recent labs are provided:      CBC:    Lab Results   Component Value Date    WBC 8.6 02/04/2021    HGB 17.1 02/04/2021    HCT 52.4 02/04/2021     02/04/2021       Renal:    Lab Results   Component Value Date     02/04/2021    K 4.5 02/04/2021     02/04/2021    CO2 29 02/04/2021    BUN 38 02/04/2021    CREATININE 1.6 02/04/2021    CALCIUM 9.3 02/04/2021         Significant Diagnostic Studies    Radiology:   XR CHEST PORTABLE   Final Result   Markedly improved aeration of the right lower lobe persistent bilateral airspace infiltrates and cardiomegaly. **This report has been created using voice recognition software. It may contain minor errors which are inherent in voice recognition technology. **      Final report electronically signed by Dr. Smith Credit on 2/3/2021 4:06 PM      US RENAL COMPLETE   Final Result   Bilateral elevated renal cortical echogenicity and elevated right resistive index. Bilateral perinephric free fluid. Findings are suggestive of nonspecific medical renal disease. Perinephric fluid suggests a more acute process. **This report has been created using voice recognition software. It may contain minor errors which are inherent in voice recognition technology. **      Final report electronically signed by Dr. Smith Credit on 1/29/2021 1:15 PM      XR CHEST PORTABLE   Final Result   1. Bilateral diffuse interstitial opacities are present which may represent edema and/or pneumonia. However there are more focal consolidative opacities at the right greater than left lung bases which likely represents underlying pneumonia. Recommend    continued follow-up. 2. Mild to moderate enlargement of the cardiomediastinal silhouette is noted. **This report has been created using voice recognition software. It may contain minor errors which are inherent in voice recognition technology. **      Final report electronically signed by Dr. Severo Lain on 1/28/2021 10:30 AM             Consults:     IP CONSULT TO PHARMACY  IP CONSULT TO SPIRITUAL SERVICES  IP CONSULT TO NEPHROLOGY    Disposition:    [x] Home       [] TCU       [] Rehab       [] Psych       [] SNF       [] [place]       [] Other-    Condition at Discharge: Stable    Code Status:  Full Code     Patient Instructions:    Discharge lab work: BMP  Activity: activity as tolerated  Diet: DIET CARB CONTROL; Follow-up visits:   Haley Recinos MD  [address]  [phone]    On 2/11/2021  at 230pm virtual appointment         Discharge Medications:      Gelacio Foil   Home Medication Instructions WUI:[id number]    Printed on:02/04/21 1128   Medication Information                      albuterol sulfate  (90 Base) MCG/ACT inhaler  Inhale 2 puffs into the lungs every 6 hours as needed for Wheezing             Alcohol Swabs (ALCOHOL PREP) 70 % PADS  3 times daily             amLODIPine (NORVASC) 10 MG tablet  Take 1 tablet by mouth daily             ascorbic acid (VITAMIN C) 500 MG tablet  Take 1 tablet by mouth daily             blood glucose monitor strips  Test 3 times a day & as needed for symptoms of irregular blood glucose.              dexamethasone (DECADRON) 6 MG tablet  Take 1 tablet by mouth daily for 2 days             glimepiride (AMARYL) 1 MG tablet  Take 2 tablets by mouth every morning (before breakfast)             glucose monitoring kit (FREESTYLE) monitoring kit  1 kit by Does not apply route daily             hydrALAZINE (APRESOLINE) 25 MG tablet  Take 1 tablet by mouth every 8 hours as needed (FOR SBP > 180 or DBP > 100) Lancets MISC  1 each by Does not apply route 2 times daily             METFORMIN HCL PO  Take 500 mg by mouth daily              Vitamin D (CHOLECALCIFEROL) 50 MCG (2000 UT) TABS tablet  Take 1 tablet by mouth daily             zinc sulfate (ZINCATE) 220 (50 Zn) MG capsule  Take 1 capsule by mouth daily                 Time Spent on discharge is more than 30 minutes in the examination, evaluation, counseling and review of medications and discharge plan. Signed: Thank you Ruba Kemp MD for the opportunity to be involved in this patient's care.     Electronically signed by Darrin Christine MD on 2/4/2021 at 11:21 AM

## 2021-02-05 ENCOUNTER — CARE COORDINATION (OUTPATIENT)
Dept: CASE MANAGEMENT | Age: 66
End: 2021-02-05

## 2021-02-05 NOTE — CARE COORDINATION
Date/Time:  2/5/2021 9:24 AM  Attempted to reach patient by telephone. Call within 2 business days of discharge: Yes  Patient hung up when I introduced myself, called back and voicemail is not set up to receive messages. Will attempt to reach patient again.

## 2021-02-08 NOTE — CARE COORDINATION
Patient has Non [facility] PCP. Final Call.     Kae Brunner, LPN    [phone]  Aubrey Khan / [place]  Coordinator

## 2021-02-25 ENCOUNTER — OFFICE VISIT (OUTPATIENT)
Dept: NEPHROLOGY | Age: 66
End: 2021-02-25
Payer: MEDICARE

## 2021-02-25 ENCOUNTER — NURSE ONLY (OUTPATIENT)
Dept: LAB | Age: 66
End: 2021-02-25

## 2021-02-25 VITALS
BODY MASS INDEX: 28.63 KG/M2 | HEART RATE: 110 BPM | DIASTOLIC BLOOD PRESSURE: 80 MMHG | OXYGEN SATURATION: 97 % | SYSTOLIC BLOOD PRESSURE: 117 MMHG | TEMPERATURE: 97.5 F | WEIGHT: 223 LBS

## 2021-02-25 DIAGNOSIS — E11.21 DIABETIC NEPHROPATHY WITH PROTEINURIA (HCC): ICD-10-CM

## 2021-02-25 DIAGNOSIS — N18.31 STAGE 3A CHRONIC KIDNEY DISEASE (HCC): Primary | ICD-10-CM

## 2021-02-25 DIAGNOSIS — I10 ESSENTIAL HYPERTENSION: ICD-10-CM

## 2021-02-25 LAB
ANION GAP SERPL CALCULATED.3IONS-SCNC: 11 MEQ/L (ref 8–16)
BUN BLDV-MCNC: 14 MG/DL (ref 7–22)
CALCIUM SERPL-MCNC: 9.8 MG/DL (ref 8.5–10.5)
CHLORIDE BLD-SCNC: 102 MEQ/L (ref 98–111)
CO2: 27 MEQ/L (ref 23–33)
CREAT SERPL-MCNC: 1.2 MG/DL (ref 0.4–1.2)
GFR SERPL CREATININE-BSD FRML MDRD: 15 ML/MIN/1.73M2
GFR SERPL CREATININE-BSD FRML MDRD: 18 ML/MIN/1.73M2
GFR SERPL CREATININE-BSD FRML MDRD: 20 ML/MIN/1.73M2
GFR SERPL CREATININE-BSD FRML MDRD: 24 ML/MIN/1.73M2
GFR SERPL CREATININE-BSD FRML MDRD: 32 ML/MIN/1.73M2
GFR SERPL CREATININE-BSD FRML MDRD: 32 ML/MIN/1.73M2
GFR SERPL CREATININE-BSD FRML MDRD: 36 ML/MIN/1.73M2
GFR SERPL CREATININE-BSD FRML MDRD: 43 ML/MIN/1.73M2
GFR SERPL CREATININE-BSD FRML MDRD: 61 ML/MIN/1.73M2
GLUCOSE BLD-MCNC: 133 MG/DL (ref 70–108)
POTASSIUM SERPL-SCNC: 4.4 MEQ/L (ref 3.5–5.2)
SODIUM BLD-SCNC: 140 MEQ/L (ref 135–145)

## 2021-02-25 PROCEDURE — 4040F PNEUMOC VAC/ADMIN/RCVD: CPT | Performed by: INTERNAL MEDICINE

## 2021-02-25 PROCEDURE — 1111F DSCHRG MED/CURRENT MED MERGE: CPT | Performed by: INTERNAL MEDICINE

## 2021-02-25 PROCEDURE — G8484 FLU IMMUNIZE NO ADMIN: HCPCS | Performed by: INTERNAL MEDICINE

## 2021-02-25 PROCEDURE — 1036F TOBACCO NON-USER: CPT | Performed by: INTERNAL MEDICINE

## 2021-02-25 PROCEDURE — G8427 DOCREV CUR MEDS BY ELIG CLIN: HCPCS | Performed by: INTERNAL MEDICINE

## 2021-02-25 PROCEDURE — 2022F DILAT RTA XM EVC RTNOPTHY: CPT | Performed by: INTERNAL MEDICINE

## 2021-02-25 PROCEDURE — 99213 OFFICE O/P EST LOW 20 MIN: CPT | Performed by: INTERNAL MEDICINE

## 2021-02-25 PROCEDURE — 3046F HEMOGLOBIN A1C LEVEL >9.0%: CPT | Performed by: INTERNAL MEDICINE

## 2021-02-25 PROCEDURE — 1123F ACP DISCUSS/DSCN MKR DOCD: CPT | Performed by: INTERNAL MEDICINE

## 2021-02-25 PROCEDURE — G8417 CALC BMI ABV UP PARAM F/U: HCPCS | Performed by: INTERNAL MEDICINE

## 2021-02-25 PROCEDURE — 3017F COLORECTAL CA SCREEN DOC REV: CPT | Performed by: INTERNAL MEDICINE

## 2021-02-25 NOTE — PROGRESS NOTES
[facility]        Outpatient Follow-Up note         2/25/2021 10:49 AM    Patient Name:   Yusef Bernstein  YOB: 1955  Primary Care Physician:  Piotr Miguel MD     [address] [facility]  [address]  Dept: [phone]  Loc: [phone]     Chief Complaint / Reason for follow-up : Follow Up of CKD      Interval History :  Patient seen and examined by me. Feels well. No cp or SOB.    Was in the hospital for Drake and was in JERMAINE on creat 2.8     Past History :  Past Medical History:   Diagnosis Date    Diabetes mellitus ([address]     Hypertension      Past Surgical History:   Procedure Laterality Date    MANDIBLE FRACTURE SURGERY          Medications :     Outpatient Medications Marked as Taking for the 2/25/21 encounter (Office Visit) with Cleo Segovia MD   Medication Sig Dispense Refill    albuterol sulfate  (90 Base) MCG/ACT inhaler Inhale 2 puffs into the lungs every 6 hours as needed for Wheezing 1 Inhaler 0    hydrALAZINE (APRESOLINE) 25 MG tablet Take 1 tablet by mouth every 8 hours as needed (FOR SBP > 180 or DBP > 100) 90 tablet 0    amLODIPine (NORVASC) 10 MG tablet Take 1 tablet by mouth daily 30 tablet 0    zinc sulfate (ZINCATE) 220 (50 Zn) MG capsule Take 1 capsule by mouth daily 30 capsule 0    ascorbic acid (VITAMIN C) 500 MG tablet Take 1 tablet by mouth daily 30 tablet 0    Vitamin D (CHOLECALCIFEROL) 50 MCG (2000 UT) TABS tablet Take 1 tablet by mouth daily 60 tablet 0    METFORMIN HCL PO Take 500 mg by mouth daily       glimepiride (AMARYL) 1 MG tablet Take 2 tablets by mouth every morning (before breakfast) 30 tablet 1    glucose monitoring kit (FREESTYLE) monitoring kit 1 kit by Does not apply route daily 1 kit 0  blood glucose monitor strips Test 3 times a day & as needed for symptoms of irregular blood glucose. 100 strip 0    Lancets MISC 1 each by Does not apply route 2 times daily 100 each 5    Alcohol Swabs (ALCOHOL PREP) 70 % PADS 3 times daily 100 each 11       Vitals     /80 (Site: Left Upper Arm, Position: Sitting, Cuff Size: Medium Adult)   Pulse 110   Temp 97.5 °F (36.4 °C) (Temporal)   Wt 223 lb (101.2 kg)   SpO2 97%   BMI 28.63 kg/m²  Wt Readings from Last 3 Encounters:   02/25/21 223 lb (101.2 kg)   01/28/21 220 lb (99.8 kg)   05/03/20 228 lb 4.8 oz (103.6 kg)        Physical Exam     General -- no distress  Lungs -- clear  Heart -- S1, S2 heard, JVD - no  Abdomen - soft, non-tender  Extremities -- no edema  CNS - awake and alert    Labs, Radiology and Tests    Labs -    1/21           Potassium 4.5           BUN 38           Creatinine 1.6           eGFR 43                       UPCR 940           UMCR 687                         Renal Ultrasound Scan --1/21  Right kidney 12.6 cm left kidney 12.6 cm  Elevated cortical echogenicity and elevated resistive indices consistent with medical renal disease     Assessment    1. Renal -patient definitely has chronic kidney disease at least stage III/2 mostly due to diabetic nephropathy  -Has almost 1 g of protein in the urine. No new lab work available  -We will check some lab work today. 2. Electrolytes appear to be within normal limits  3. Essential hypertension running well continue current medications  4. Proteinuria mostly diabetic nephropathy however we will recheck in the next visit  5.  Recent recovery from COVID-19 pneumonia  6. meds reviewed and D/W patient    Tests and orders placed this Encounter     Orders Placed This Encounter   Procedures    Basic Metabolic Panel    Comprehensive Metabolic Panel    Creatinine, Random Urine    Urinalysis with Microscopic    MICROALBUMIN, RANDOM URINE (W/O CREATININE)    Protein, urine, random

## 2021-10-14 ENCOUNTER — NURSE ONLY (OUTPATIENT)
Dept: LAB | Age: 66
End: 2021-10-14

## 2021-10-14 DIAGNOSIS — E11.21 DIABETIC NEPHROPATHY WITH PROTEINURIA (HCC): ICD-10-CM

## 2021-10-14 DIAGNOSIS — N18.31 STAGE 3A CHRONIC KIDNEY DISEASE (HCC): ICD-10-CM

## 2021-10-14 DIAGNOSIS — I10 ESSENTIAL HYPERTENSION: ICD-10-CM

## 2021-10-14 LAB
ALBUMIN SERPL-MCNC: 4.9 G/DL (ref 3.5–5.1)
ALP BLD-CCNC: 85 U/L (ref 38–126)
ALT SERPL-CCNC: 37 U/L (ref 11–66)
ANION GAP SERPL CALCULATED.3IONS-SCNC: 13 MEQ/L (ref 8–16)
AST SERPL-CCNC: 32 U/L (ref 5–40)
BACTERIA: ABNORMAL
BILIRUB SERPL-MCNC: 1.1 MG/DL (ref 0.3–1.2)
BILIRUBIN URINE: NEGATIVE
BLOOD, URINE: NEGATIVE
BUN BLDV-MCNC: 13 MG/DL (ref 7–22)
CALCIUM SERPL-MCNC: 9.9 MG/DL (ref 8.5–10.5)
CASTS: ABNORMAL /LPF
CASTS: ABNORMAL /LPF
CHARACTER, URINE: CLEAR
CHLORIDE BLD-SCNC: 101 MEQ/L (ref 98–111)
CO2: 25 MEQ/L (ref 23–33)
COLOR: ABNORMAL
CREAT SERPL-MCNC: 1.3 MG/DL (ref 0.4–1.2)
CREATININE URINE: 362.6 MG/DL
CRYSTALS: ABNORMAL
EPITHELIAL CELLS, UA: ABNORMAL /HPF
GFR SERPL CREATININE-BSD FRML MDRD: 67 ML/MIN/1.73M2
GLUCOSE BLD-MCNC: 117 MG/DL (ref 70–108)
GLUCOSE, URINE: NEGATIVE MG/DL
KETONES, URINE: ABNORMAL
LEUKOCYTE ESTERASE, URINE: NEGATIVE
MISCELLANEOUS LAB TEST RESULT: ABNORMAL
NITRITE, URINE: NEGATIVE
PH UA: 5 (ref 5–9)
POTASSIUM SERPL-SCNC: 4.5 MEQ/L (ref 3.5–5.2)
PROTEIN UA: 300 MG/DL
PROTEIN, URINE: 359.4 MG/DL
RBC URINE: ABNORMAL /HPF
RENAL EPITHELIAL, UA: ABNORMAL
SODIUM BLD-SCNC: 139 MEQ/L (ref 135–145)
SPECIFIC GRAVITY UA: 1.03 (ref 1–1.03)
TOTAL PROTEIN: 7.7 G/DL (ref 6.1–8)
UROBILINOGEN, URINE: 1 EU/DL (ref 0–1)
WBC UA: ABNORMAL /HPF
YEAST: ABNORMAL

## 2022-02-07 DIAGNOSIS — N18.31 STAGE 3A CHRONIC KIDNEY DISEASE (HCC): Primary | ICD-10-CM

## 2023-12-01 ENCOUNTER — APPOINTMENT (OUTPATIENT)
Dept: GENERAL RADIOLOGY | Age: 68
DRG: 190 | End: 2023-12-01
Payer: MEDICARE

## 2023-12-01 ENCOUNTER — HOSPITAL ENCOUNTER (INPATIENT)
Age: 68
LOS: 2 days | Discharge: HOME | DRG: 190 | End: 2023-12-03
Attending: EMERGENCY MEDICINE | Admitting: STUDENT IN AN ORGANIZED HEALTH CARE EDUCATION/TRAINING PROGRAM
Payer: MEDICARE

## 2023-12-01 ENCOUNTER — APPOINTMENT (OUTPATIENT)
Dept: CT IMAGING | Age: 68
DRG: 190 | End: 2023-12-01
Payer: MEDICARE

## 2023-12-01 ENCOUNTER — APPOINTMENT (OUTPATIENT)
Dept: ULTRASOUND IMAGING | Age: 68
DRG: 190 | End: 2023-12-01
Payer: MEDICARE

## 2023-12-01 DIAGNOSIS — R09.02 HYPOXIA: ICD-10-CM

## 2023-12-01 DIAGNOSIS — I16.0 HYPERTENSIVE URGENCY: ICD-10-CM

## 2023-12-01 DIAGNOSIS — R14.0 ABDOMINAL DISTENTION: ICD-10-CM

## 2023-12-01 DIAGNOSIS — R00.0 TACHYCARDIA: ICD-10-CM

## 2023-12-01 DIAGNOSIS — R06.00 DYSPNEA AND RESPIRATORY ABNORMALITIES: Primary | ICD-10-CM

## 2023-12-01 DIAGNOSIS — R03.0 ELEVATED BLOOD PRESSURE READING WITHOUT DIAGNOSIS OF HYPERTENSION: ICD-10-CM

## 2023-12-01 DIAGNOSIS — I16.1 HYPERTENSIVE EMERGENCY: ICD-10-CM

## 2023-12-01 DIAGNOSIS — R29.818 SUSPECTED SLEEP APNEA: ICD-10-CM

## 2023-12-01 DIAGNOSIS — R06.89 DYSPNEA AND RESPIRATORY ABNORMALITIES: Primary | ICD-10-CM

## 2023-12-01 LAB
ALBUMIN SERPL BCG-MCNC: 3.9 G/DL (ref 3.5–5.1)
ALP SERPL-CCNC: 112 U/L (ref 38–126)
ALT SERPL W/O P-5'-P-CCNC: 29 U/L (ref 11–66)
AMPHETAMINES UR QL SCN: NEGATIVE
ANION GAP SERPL CALC-SCNC: 15 MEQ/L (ref 8–16)
AST SERPL-CCNC: 23 U/L (ref 5–40)
BACTERIA URNS QL MICRO: ABNORMAL /HPF
BARBITURATES UR QL SCN: NEGATIVE
BASOPHILS ABSOLUTE: 0.1 THOU/MM3 (ref 0–0.1)
BASOPHILS NFR BLD AUTO: 0.7 %
BENZODIAZ UR QL SCN: NEGATIVE
BILIRUB SERPL-MCNC: 1.7 MG/DL (ref 0.3–1.2)
BILIRUB UR QL STRIP.AUTO: NEGATIVE
BUN SERPL-MCNC: 15 MG/DL (ref 7–22)
BZE UR QL SCN: NEGATIVE
CALCIUM SERPL-MCNC: 9.4 MG/DL (ref 8.5–10.5)
CANNABINOIDS UR QL SCN: NEGATIVE
CASTS #/AREA URNS LPF: ABNORMAL /LPF
CASTS 2: ABNORMAL /LPF
CHARACTER UR: CLEAR
CHLORIDE SERPL-SCNC: 101 MEQ/L (ref 98–111)
CO2 SERPL-SCNC: 26 MEQ/L (ref 23–33)
COLOR: YELLOW
CREAT SERPL-MCNC: 1.1 MG/DL (ref 0.4–1.2)
CRYSTALS URNS MICRO: ABNORMAL
DEPRECATED RDW RBC AUTO: 43.5 FL (ref 35–45)
EKG ATRIAL RATE: 135 BPM
EKG P AXIS: -116 DEGREES
EKG P-R INTERVAL: 146 MS
EKG Q-T INTERVAL: 296 MS
EKG QRS DURATION: 146 MS
EKG QTC CALCULATION (BAZETT): 444 MS
EKG R AXIS: -35 DEGREES
EKG T AXIS: 155 DEGREES
EKG VENTRICULAR RATE: 135 BPM
EOSINOPHIL NFR BLD AUTO: 1 %
EOSINOPHILS ABSOLUTE: 0.1 THOU/MM3 (ref 0–0.4)
EPITHELIAL CELLS, UA: ABNORMAL /HPF
ERYTHROCYTE [DISTWIDTH] IN BLOOD BY AUTOMATED COUNT: 12.6 % (ref 11.5–14.5)
ETHANOL SERPL-MCNC: < 0.01 %
FENTANYL: NEGATIVE
FLUAV AG SPEC QL: NEGATIVE
FLUBV AG SPEC QL: NEGATIVE
GFR SERPL CREATININE-BSD FRML MDRD: > 60 ML/MIN/1.73M2
GLUCOSE BLD STRIP.AUTO-MCNC: 186 MG/DL (ref 70–108)
GLUCOSE SERPL-MCNC: 128 MG/DL (ref 70–108)
GLUCOSE UR QL STRIP.AUTO: NEGATIVE MG/DL
HCT VFR BLD AUTO: 51.1 % (ref 42–52)
HGB BLD-MCNC: 17.1 GM/DL (ref 14–18)
HGB UR QL STRIP.AUTO: NEGATIVE
IMM GRANULOCYTES # BLD AUTO: 0.03 THOU/MM3 (ref 0–0.07)
IMM GRANULOCYTES NFR BLD AUTO: 0.4 %
KETONES UR QL STRIP.AUTO: 15
LACTATE SERPL-SCNC: 1.8 MMOL/L (ref 0.5–2)
LIPASE SERPL-CCNC: 12.1 U/L (ref 5.6–51.3)
LYMPHOCYTES ABSOLUTE: 1.5 THOU/MM3 (ref 1–4.8)
LYMPHOCYTES NFR BLD AUTO: 17.6 %
MCH RBC QN AUTO: 31.5 PG (ref 26–33)
MCHC RBC AUTO-ENTMCNC: 33.5 GM/DL (ref 32.2–35.5)
MCV RBC AUTO: 94.3 FL (ref 80–94)
MISCELLANEOUS 2: ABNORMAL
MONOCYTES ABSOLUTE: 0.7 THOU/MM3 (ref 0.4–1.3)
MONOCYTES NFR BLD AUTO: 8.3 %
NEUTROPHILS NFR BLD AUTO: 72 %
NITRITE UR QL STRIP: NEGATIVE
NRBC BLD AUTO-RTO: 0 /100 WBC
OPIATES UR QL SCN: NEGATIVE
OSMOLALITY SERPL CALC.SUM OF ELEC: 285.6 MOSMOL/KG (ref 275–300)
OXYCODONE: NEGATIVE
PCP UR QL SCN: NEGATIVE
PH UR STRIP.AUTO: 7 [PH] (ref 5–9)
PLATELET # BLD AUTO: 261 THOU/MM3 (ref 130–400)
PMV BLD AUTO: 11.5 FL (ref 9.4–12.4)
POTASSIUM SERPL-SCNC: 3.7 MEQ/L (ref 3.5–5.2)
PROCALCITONIN SERPL IA-MCNC: 0.11 NG/ML (ref 0.01–0.09)
PROT SERPL-MCNC: 7.7 G/DL (ref 6.1–8)
PROT UR STRIP.AUTO-MCNC: 300 MG/DL
RBC # BLD AUTO: 5.42 MILL/MM3 (ref 4.7–6.1)
RBC URINE: ABNORMAL /HPF
RENAL EPI CELLS #/AREA URNS HPF: ABNORMAL /[HPF]
SARS-COV-2 RDRP RESP QL NAA+PROBE: NOT  DETECTED
SEGMENTED NEUTROPHILS ABSOLUTE COUNT: 6 THOU/MM3 (ref 1.8–7.7)
SODIUM SERPL-SCNC: 142 MEQ/L (ref 135–145)
SP GR UR REFRACT.AUTO: > 1.03 (ref 1–1.03)
TROPONIN, HIGH SENSITIVITY: 24 NG/L (ref 0–12)
TROPONIN, HIGH SENSITIVITY: 37 NG/L (ref 0–12)
TSH SERPL DL<=0.005 MIU/L-ACNC: 1.28 UIU/ML (ref 0.4–4.2)
UROBILINOGEN, URINE: 1 EU/DL (ref 0–1)
WBC # BLD AUTO: 8.3 THOU/MM3 (ref 4.8–10.8)
WBC #/AREA URNS HPF: ABNORMAL /HPF
WBC #/AREA URNS HPF: NEGATIVE /[HPF]
YEAST LIKE FUNGI URNS QL MICRO: ABNORMAL

## 2023-12-01 PROCEDURE — 87641 MR-STAPH DNA AMP PROBE: CPT

## 2023-12-01 PROCEDURE — 83605 ASSAY OF LACTIC ACID: CPT

## 2023-12-01 PROCEDURE — 6360000004 HC RX CONTRAST MEDICATION: Performed by: PHYSICIAN ASSISTANT

## 2023-12-01 PROCEDURE — 76705 ECHO EXAM OF ABDOMEN: CPT

## 2023-12-01 PROCEDURE — 84443 ASSAY THYROID STIM HORMONE: CPT

## 2023-12-01 PROCEDURE — 80307 DRUG TEST PRSMV CHEM ANLYZR: CPT

## 2023-12-01 PROCEDURE — 93005 ELECTROCARDIOGRAM TRACING: CPT | Performed by: EMERGENCY MEDICINE

## 2023-12-01 PROCEDURE — 6360000002 HC RX W HCPCS

## 2023-12-01 PROCEDURE — 84484 ASSAY OF TROPONIN QUANT: CPT

## 2023-12-01 PROCEDURE — 87635 SARS-COV-2 COVID-19 AMP PRB: CPT

## 2023-12-01 PROCEDURE — 83690 ASSAY OF LIPASE: CPT

## 2023-12-01 PROCEDURE — 2580000003 HC RX 258

## 2023-12-01 PROCEDURE — 2060000000 HC ICU INTERMEDIATE R&B

## 2023-12-01 PROCEDURE — 80053 COMPREHEN METABOLIC PANEL: CPT

## 2023-12-01 PROCEDURE — 96374 THER/PROPH/DIAG INJ IV PUSH: CPT

## 2023-12-01 PROCEDURE — 6360000002 HC RX W HCPCS: Performed by: PHYSICIAN ASSISTANT

## 2023-12-01 PROCEDURE — 93010 ELECTROCARDIOGRAM REPORT: CPT | Performed by: INTERNAL MEDICINE

## 2023-12-01 PROCEDURE — 87070 CULTURE OTHR SPECIMN AEROBIC: CPT

## 2023-12-01 PROCEDURE — 84145 PROCALCITONIN (PCT): CPT

## 2023-12-01 PROCEDURE — 82077 ASSAY SPEC XCP UR&BREATH IA: CPT

## 2023-12-01 PROCEDURE — 96361 HYDRATE IV INFUSION ADD-ON: CPT

## 2023-12-01 PROCEDURE — 99215 OFFICE O/P EST HI 40 MIN: CPT

## 2023-12-01 PROCEDURE — 87804 INFLUENZA ASSAY W/OPTIC: CPT

## 2023-12-01 PROCEDURE — 82948 REAGENT STRIP/BLOOD GLUCOSE: CPT

## 2023-12-01 PROCEDURE — 36415 COLL VENOUS BLD VENIPUNCTURE: CPT

## 2023-12-01 PROCEDURE — 99213 OFFICE O/P EST LOW 20 MIN: CPT | Performed by: EMERGENCY MEDICINE

## 2023-12-01 PROCEDURE — 99285 EMERGENCY DEPT VISIT HI MDM: CPT

## 2023-12-01 PROCEDURE — 2580000003 HC RX 258: Performed by: PHYSICIAN ASSISTANT

## 2023-12-01 PROCEDURE — 71046 X-RAY EXAM CHEST 2 VIEWS: CPT

## 2023-12-01 PROCEDURE — 85025 COMPLETE CBC W/AUTO DIFF WBC: CPT

## 2023-12-01 PROCEDURE — 81001 URINALYSIS AUTO W/SCOPE: CPT

## 2023-12-01 PROCEDURE — 74177 CT ABD & PELVIS W/CONTRAST: CPT

## 2023-12-01 PROCEDURE — 87040 BLOOD CULTURE FOR BACTERIA: CPT

## 2023-12-01 PROCEDURE — 71275 CT ANGIOGRAPHY CHEST: CPT

## 2023-12-01 RX ORDER — ASCORBIC ACID 500 MG
500 TABLET ORAL DAILY
Status: DISCONTINUED | OUTPATIENT
Start: 2023-12-02 | End: 2023-12-03 | Stop reason: HOSPADM

## 2023-12-01 RX ORDER — POTASSIUM CHLORIDE 20 MEQ/1
40 TABLET, EXTENDED RELEASE ORAL PRN
Status: DISCONTINUED | OUTPATIENT
Start: 2023-12-01 | End: 2023-12-03 | Stop reason: HOSPADM

## 2023-12-01 RX ORDER — IPRATROPIUM BROMIDE AND ALBUTEROL SULFATE 2.5; .5 MG/3ML; MG/3ML
1 SOLUTION RESPIRATORY (INHALATION)
Status: DISCONTINUED | OUTPATIENT
Start: 2023-12-02 | End: 2023-12-01

## 2023-12-01 RX ORDER — IBUPROFEN 600 MG/1
1 TABLET ORAL PRN
Status: DISCONTINUED | OUTPATIENT
Start: 2023-12-01 | End: 2023-12-03 | Stop reason: HOSPADM

## 2023-12-01 RX ORDER — SODIUM CHLORIDE 0.9 % (FLUSH) 0.9 %
5-40 SYRINGE (ML) INJECTION EVERY 12 HOURS SCHEDULED
Status: DISCONTINUED | OUTPATIENT
Start: 2023-12-01 | End: 2023-12-03 | Stop reason: HOSPADM

## 2023-12-01 RX ORDER — ACETAMINOPHEN 325 MG/1
650 TABLET ORAL EVERY 6 HOURS PRN
Status: DISCONTINUED | OUTPATIENT
Start: 2023-12-01 | End: 2023-12-03 | Stop reason: HOSPADM

## 2023-12-01 RX ORDER — ENOXAPARIN SODIUM 100 MG/ML
30 INJECTION SUBCUTANEOUS 2 TIMES DAILY
Status: DISCONTINUED | OUTPATIENT
Start: 2023-12-01 | End: 2023-12-03 | Stop reason: HOSPADM

## 2023-12-01 RX ORDER — INSULIN LISPRO 100 [IU]/ML
0-4 INJECTION, SOLUTION INTRAVENOUS; SUBCUTANEOUS
Status: DISCONTINUED | OUTPATIENT
Start: 2023-12-02 | End: 2023-12-02

## 2023-12-01 RX ORDER — PREDNISONE 20 MG/1
40 TABLET ORAL DAILY
Status: DISCONTINUED | OUTPATIENT
Start: 2023-12-01 | End: 2023-12-03 | Stop reason: HOSPADM

## 2023-12-01 RX ORDER — HYDRALAZINE HYDROCHLORIDE 20 MG/ML
10 INJECTION INTRAMUSCULAR; INTRAVENOUS ONCE
Status: COMPLETED | OUTPATIENT
Start: 2023-12-01 | End: 2023-12-01

## 2023-12-01 RX ORDER — ZINC SULFATE 50(220)MG
50 CAPSULE ORAL DAILY
Status: DISCONTINUED | OUTPATIENT
Start: 2023-12-02 | End: 2023-12-03 | Stop reason: HOSPADM

## 2023-12-01 RX ORDER — VITAMIN B COMPLEX
2000 TABLET ORAL DAILY
Status: DISCONTINUED | OUTPATIENT
Start: 2023-12-02 | End: 2023-12-03 | Stop reason: HOSPADM

## 2023-12-01 RX ORDER — INSULIN LISPRO 100 [IU]/ML
0-4 INJECTION, SOLUTION INTRAVENOUS; SUBCUTANEOUS NIGHTLY
Status: DISCONTINUED | OUTPATIENT
Start: 2023-12-01 | End: 2023-12-02

## 2023-12-01 RX ORDER — PREDNISONE 20 MG/1
40 TABLET ORAL DAILY
Status: DISCONTINUED | OUTPATIENT
Start: 2023-12-02 | End: 2023-12-01

## 2023-12-01 RX ORDER — MAGNESIUM SULFATE IN WATER 40 MG/ML
2000 INJECTION, SOLUTION INTRAVENOUS PRN
Status: DISCONTINUED | OUTPATIENT
Start: 2023-12-01 | End: 2023-12-03 | Stop reason: HOSPADM

## 2023-12-01 RX ORDER — IPRATROPIUM BROMIDE AND ALBUTEROL SULFATE 2.5; .5 MG/3ML; MG/3ML
1 SOLUTION RESPIRATORY (INHALATION)
Status: DISCONTINUED | OUTPATIENT
Start: 2023-12-02 | End: 2023-12-02

## 2023-12-01 RX ORDER — POTASSIUM CHLORIDE 7.45 MG/ML
10 INJECTION INTRAVENOUS PRN
Status: DISCONTINUED | OUTPATIENT
Start: 2023-12-01 | End: 2023-12-03 | Stop reason: HOSPADM

## 2023-12-01 RX ORDER — DEXTROSE MONOHYDRATE 100 MG/ML
INJECTION, SOLUTION INTRAVENOUS CONTINUOUS PRN
Status: DISCONTINUED | OUTPATIENT
Start: 2023-12-01 | End: 2023-12-03 | Stop reason: HOSPADM

## 2023-12-01 RX ORDER — ONDANSETRON 4 MG/1
4 TABLET, ORALLY DISINTEGRATING ORAL EVERY 8 HOURS PRN
Status: DISCONTINUED | OUTPATIENT
Start: 2023-12-01 | End: 2023-12-03 | Stop reason: HOSPADM

## 2023-12-01 RX ORDER — ONDANSETRON 2 MG/ML
4 INJECTION INTRAMUSCULAR; INTRAVENOUS EVERY 6 HOURS PRN
Status: DISCONTINUED | OUTPATIENT
Start: 2023-12-01 | End: 2023-12-03 | Stop reason: HOSPADM

## 2023-12-01 RX ORDER — ACETAMINOPHEN 650 MG/1
650 SUPPOSITORY RECTAL EVERY 6 HOURS PRN
Status: DISCONTINUED | OUTPATIENT
Start: 2023-12-01 | End: 2023-12-03 | Stop reason: HOSPADM

## 2023-12-01 RX ORDER — AMLODIPINE BESYLATE 10 MG/1
10 TABLET ORAL DAILY
Status: DISCONTINUED | OUTPATIENT
Start: 2023-12-02 | End: 2023-12-02

## 2023-12-01 RX ORDER — POLYETHYLENE GLYCOL 3350 17 G/17G
17 POWDER, FOR SOLUTION ORAL DAILY PRN
Status: DISCONTINUED | OUTPATIENT
Start: 2023-12-01 | End: 2023-12-03 | Stop reason: HOSPADM

## 2023-12-01 RX ORDER — 0.9 % SODIUM CHLORIDE 0.9 %
1000 INTRAVENOUS SOLUTION INTRAVENOUS ONCE
Status: COMPLETED | OUTPATIENT
Start: 2023-12-01 | End: 2023-12-01

## 2023-12-01 RX ORDER — SODIUM CHLORIDE 0.9 % (FLUSH) 0.9 %
5-40 SYRINGE (ML) INJECTION PRN
Status: DISCONTINUED | OUTPATIENT
Start: 2023-12-01 | End: 2023-12-03 | Stop reason: HOSPADM

## 2023-12-01 RX ORDER — DILTIAZEM HYDROCHLORIDE 5 MG/ML
20 INJECTION INTRAVENOUS ONCE
Status: DISCONTINUED | OUTPATIENT
Start: 2023-12-01 | End: 2023-12-01

## 2023-12-01 RX ORDER — SODIUM CHLORIDE 9 MG/ML
INJECTION, SOLUTION INTRAVENOUS PRN
Status: DISCONTINUED | OUTPATIENT
Start: 2023-12-01 | End: 2023-12-03 | Stop reason: HOSPADM

## 2023-12-01 RX ADMIN — AZITHROMYCIN MONOHYDRATE 500 MG: 500 INJECTION, POWDER, LYOPHILIZED, FOR SOLUTION INTRAVENOUS at 21:35

## 2023-12-01 RX ADMIN — SODIUM CHLORIDE 1000 ML: 9 INJECTION, SOLUTION INTRAVENOUS at 16:27

## 2023-12-01 RX ADMIN — HYDRALAZINE HYDROCHLORIDE 10 MG: 20 INJECTION, SOLUTION INTRAMUSCULAR; INTRAVENOUS at 20:16

## 2023-12-01 RX ADMIN — IOPAMIDOL 80 ML: 755 INJECTION, SOLUTION INTRAVENOUS at 16:12

## 2023-12-01 RX ADMIN — LABETALOL HYDROCHLORIDE 1 MG/MIN: 5 INJECTION INTRAVENOUS at 21:48

## 2023-12-01 ASSESSMENT — ENCOUNTER SYMPTOMS
SINUS PRESSURE: 0
COUGH: 1
VOMITING: 1
CHEST TIGHTNESS: 1
SHORTNESS OF BREATH: 1
CONSTIPATION: 0
DIARRHEA: 0
EYE ITCHING: 0
ABDOMINAL DISTENTION: 1
RHINORRHEA: 0
NAUSEA: 0
ABDOMINAL PAIN: 0
EYE DISCHARGE: 0
SORE THROAT: 0

## 2023-12-01 ASSESSMENT — PAIN - FUNCTIONAL ASSESSMENT
PAIN_FUNCTIONAL_ASSESSMENT: NONE - DENIES PAIN
PAIN_FUNCTIONAL_ASSESSMENT: 0-10
PAIN_FUNCTIONAL_ASSESSMENT: NONE - DENIES PAIN
PAIN_FUNCTIONAL_ASSESSMENT: NONE - DENIES PAIN

## 2023-12-01 ASSESSMENT — PAIN DESCRIPTION - DESCRIPTORS: DESCRIPTORS: OTHER (COMMENT)

## 2023-12-01 ASSESSMENT — PAIN DESCRIPTION - PAIN TYPE
TYPE: ACUTE PAIN
TYPE: ACUTE PAIN

## 2023-12-01 ASSESSMENT — PAIN DESCRIPTION - LOCATION: LOCATION: ABDOMEN

## 2023-12-01 ASSESSMENT — PAIN SCALES - GENERAL
PAINLEVEL_OUTOF10: 0
PAINLEVEL_OUTOF10: 4
PAINLEVEL_OUTOF10: 0
PAINLEVEL_OUTOF10: 0

## 2023-12-01 NOTE — DISCHARGE INSTRUCTIONS
Go directly to [facility] emergency department for further evaluation of your rapid heart rate and shortness of breath

## 2023-12-01 NOTE — ED NOTES
To [facility] with complaints of SOB for 3 days. States he has a cough with white phlegm. States he is fatigued and abd swelling. Cant do activity due to SOB. Sats 88% after walking but up to 91% after sitting. Swabbed for covid and flu.       Zulma Crow RN  12/01/23 7177

## 2023-12-01 NOTE — ED PROVIDER NOTES
[facility] EMERGENCY DEPT  Urgent Care Encounter       CHIEF COMPLAINT       Chief Complaint   Patient presents with    Cough    Shortness of Breath       Nurses Notes reviewed and I agree except as noted in the HPI. HISTORY OF PRESENT ILLNESS   Oliver Chery is a 76 y.o. male who presents for 3-day history of increased shortness of breath. No fever. No body aches. States he is coughing clear phlegm. Denies any leg swelling or weight gain. Denies any cardiac history. Patient reports he could not catch his breath last evening and had a passing out episode. He reports he has noticed that his heart rate feels like it is beating fast.  HPI    REVIEW OF SYSTEMS     Review of Systems   Constitutional:  Positive for activity change and fatigue. Negative for fever. Respiratory:  Positive for chest tightness and shortness of breath. Cardiovascular:  Positive for palpitations. Negative for chest pain and leg swelling. PAST MEDICAL HISTORY         Diagnosis Date    Diabetes mellitus ([address])     Hypertension        SURGICALHISTORY     Patient  has a past surgical history that includes Mandible fracture surgery. CURRENT MEDICATIONS       Previous Medications    ALBUTEROL SULFATE  (90 BASE) MCG/ACT INHALER    Inhale 2 puffs into the lungs every 6 hours as needed for Wheezing    ALCOHOL SWABS (ALCOHOL PREP) 70 % PADS    3 times daily    AMLODIPINE (NORVASC) 10 MG TABLET    Take 1 tablet by mouth daily    ASCORBIC ACID (VITAMIN C) 500 MG TABLET    Take 1 tablet by mouth daily    BLOOD GLUCOSE MONITOR STRIPS    Test 3 times a day & as needed for symptoms of irregular blood glucose.     GLIMEPIRIDE (AMARYL) 1 MG TABLET    Take 2 tablets by mouth every morning (before breakfast)    GLUCOSE MONITORING KIT (FREESTYLE) MONITORING KIT    1 kit by Does not apply route daily    HYDRALAZINE (APRESOLINE) 25 MG TABLET    Take 1 tablet by mouth every 8 hours as needed (FOR SBP > 180 or DBP > 100)    LANCETS MISC

## 2023-12-01 NOTE — ED NOTES
Pt presents to ED as a transfer from  via triage for c/o cough and shortness of breath x3 days. Pt reports coughing up white phlegm. Pt was tested for flu and covid at , both of which were negative. Upon initial assessment, pt is A&Ox4, resps easy however slightly labored. Pt found to be 81% on room air. Pt placed on 4LPM via nc and instructed to breathe through nose. Pt O2 sats up to 93%. Pt reports abdominal bloating at this time, rating 4/10. Denies any history. IV established with blood drawn and sent to lab. Protocol orders placed. Monitor applied, VS as noted. Awaiting provider assessment and further orders. Will monitor.      Eric Barthel, RN  12/01/23 0288

## 2023-12-01 NOTE — ED PROVIDER NOTES
[facility] EMERGENCY DEPT      Pt Name: Ca Flores  MRN: [id number]  [address] [facility] 1955  Date of evaluation: 12/1/2023  Provider: Joey Arguello PA-C    CHIEF COMPLAINT       Chief Complaint   Patient presents with    Cough    Shortness of Breath       Nurses Notes reviewed and I agree except as noted in the HPI. HISTORY OF PRESENT ILLNESS    Ca Flores is a 76 y.o. male who presents from urgent care by private vehicle for shortness of breath. Patient reports being sick for 3 days with cough productive of white sputum, dyspnea, abdominal bloating, \"heart racing\", generalized weakness, and dizziness. Patient has had some episodes of emesis but is unclear whether this is posttussive or not. Patient denies nausea. Patient is unable to eat normally due to the abdominal bloating. Patient denies chest pain, abdominal pain, diarrhea, constipation, dysuria, urinary frequency, headache, blurred vision, or other complaints. Patient smokes 1/2 to 1 pack of cigarettes a day. Patient admits to drinking fireball most recently. Patient denies illicit drug use. Patient has no PE or DVT risk factors. REVIEW OF SYSTEMS     Review of Systems   Constitutional:  Positive for activity change and appetite change. Negative for chills, fatigue and fever. HENT:  Negative for congestion, ear pain, rhinorrhea, sinus pressure and sore throat. Eyes:  Negative for discharge and itching. Respiratory:  Positive for cough and shortness of breath. Cardiovascular:  Positive for palpitations. Negative for chest pain. Gastrointestinal:  Positive for abdominal distention and vomiting. Negative for abdominal pain, constipation, diarrhea and nausea. Endocrine: Negative for polydipsia and polyuria. Genitourinary:  Negative for decreased urine volume, dysuria and frequency. Musculoskeletal:  Negative for gait problem and myalgias. Skin:  Negative for rash.    Neurological:  Positive for dizziness

## 2023-12-01 NOTE — ED NOTES
Per verbal orders, pt ambulated around room and around all of [facility] nurses station. Pt heart rate up to 145 while ambulating and pulse ox dropped to 87%. Pt assisted back to room and monitor reapplied. Providers notified.       Eric Barthel, RN  12/01/23 2187

## 2023-12-01 NOTE — ED NOTES
Pt on call light requesting to speak with RN. Patient states \"we [spouse] discussed it and I'd really like to have a couple pills for my blood pressure and an inhaler and I'd like to go home. \" Out to speak with provider. Verbal orders for manual BP at this time. BP obtained showing 210/130. Provider notified. RACHELLE Diaz and Dr Yolande Kelley into room to discuss POC with patient. Will await POC.       Eric Barthel, RN  12/01/23 4898

## 2023-12-02 LAB
ANION GAP SERPL CALC-SCNC: 14 MEQ/L (ref 8–16)
BUN SERPL-MCNC: 15 MG/DL (ref 7–22)
CALCIUM SERPL-MCNC: 9 MG/DL (ref 8.5–10.5)
CHLORIDE SERPL-SCNC: 101 MEQ/L (ref 98–111)
CO2 SERPL-SCNC: 26 MEQ/L (ref 23–33)
CREAT SERPL-MCNC: 1.2 MG/DL (ref 0.4–1.2)
DEPRECATED RDW RBC AUTO: 45.5 FL (ref 35–45)
EKG ATRIAL RATE: 116 BPM
EKG P AXIS: 42 DEGREES
EKG P-R INTERVAL: 188 MS
EKG Q-T INTERVAL: 330 MS
EKG QRS DURATION: 146 MS
EKG QTC CALCULATION (BAZETT): 458 MS
EKG R AXIS: -41 DEGREES
EKG T AXIS: 164 DEGREES
EKG VENTRICULAR RATE: 116 BPM
ERYTHROCYTE [DISTWIDTH] IN BLOOD BY AUTOMATED COUNT: 12.5 % (ref 11.5–14.5)
GFR SERPL CREATININE-BSD FRML MDRD: > 60 ML/MIN/1.73M2
GLUCOSE BLD STRIP.AUTO-MCNC: 181 MG/DL (ref 70–108)
GLUCOSE BLD STRIP.AUTO-MCNC: 224 MG/DL (ref 70–108)
GLUCOSE BLD STRIP.AUTO-MCNC: 247 MG/DL (ref 70–108)
GLUCOSE BLD STRIP.AUTO-MCNC: 272 MG/DL (ref 70–108)
GLUCOSE SERPL-MCNC: 174 MG/DL (ref 70–108)
HCT VFR BLD AUTO: 51.2 % (ref 42–52)
HGB BLD-MCNC: 16.5 GM/DL (ref 14–18)
MCH RBC QN AUTO: 32 PG (ref 26–33)
MCHC RBC AUTO-ENTMCNC: 32.2 GM/DL (ref 32.2–35.5)
MCV RBC AUTO: 99.2 FL (ref 80–94)
MRSA DNA SPEC QL NAA+PROBE: NEGATIVE
PLATELET # BLD AUTO: 239 THOU/MM3 (ref 130–400)
PMV BLD AUTO: 12 FL (ref 9.4–12.4)
POTASSIUM SERPL-SCNC: 3.9 MEQ/L (ref 3.5–5.2)
RBC # BLD AUTO: 5.16 MILL/MM3 (ref 4.7–6.1)
SODIUM SERPL-SCNC: 141 MEQ/L (ref 135–145)
WBC # BLD AUTO: 5.8 THOU/MM3 (ref 4.8–10.8)

## 2023-12-02 PROCEDURE — 2700000000 HC OXYGEN THERAPY PER DAY

## 2023-12-02 PROCEDURE — 85027 COMPLETE CBC AUTOMATED: CPT

## 2023-12-02 PROCEDURE — 6370000000 HC RX 637 (ALT 250 FOR IP)

## 2023-12-02 PROCEDURE — 93010 ELECTROCARDIOGRAM REPORT: CPT | Performed by: INTERNAL MEDICINE

## 2023-12-02 PROCEDURE — 80048 BASIC METABOLIC PNL TOTAL CA: CPT

## 2023-12-02 PROCEDURE — 6360000002 HC RX W HCPCS

## 2023-12-02 PROCEDURE — 2580000003 HC RX 258

## 2023-12-02 PROCEDURE — 93005 ELECTROCARDIOGRAM TRACING: CPT

## 2023-12-02 PROCEDURE — 94761 N-INVAS EAR/PLS OXIMETRY MLT: CPT

## 2023-12-02 PROCEDURE — 82948 REAGENT STRIP/BLOOD GLUCOSE: CPT

## 2023-12-02 PROCEDURE — 94640 AIRWAY INHALATION TREATMENT: CPT

## 2023-12-02 PROCEDURE — 36415 COLL VENOUS BLD VENIPUNCTURE: CPT

## 2023-12-02 PROCEDURE — 2060000000 HC ICU INTERMEDIATE R&B

## 2023-12-02 PROCEDURE — 99223 1ST HOSP IP/OBS HIGH 75: CPT | Performed by: STUDENT IN AN ORGANIZED HEALTH CARE EDUCATION/TRAINING PROGRAM

## 2023-12-02 RX ORDER — AMLODIPINE BESYLATE 10 MG/1
10 TABLET ORAL DAILY
Status: DISCONTINUED | OUTPATIENT
Start: 2023-12-02 | End: 2023-12-03 | Stop reason: HOSPADM

## 2023-12-02 RX ORDER — SODIUM CHLORIDE 9 MG/ML
INJECTION, SOLUTION INTRAVENOUS CONTINUOUS
Status: ACTIVE | OUTPATIENT
Start: 2023-12-02 | End: 2023-12-03

## 2023-12-02 RX ORDER — INSULIN LISPRO 100 [IU]/ML
0-8 INJECTION, SOLUTION INTRAVENOUS; SUBCUTANEOUS
Status: DISCONTINUED | OUTPATIENT
Start: 2023-12-02 | End: 2023-12-03 | Stop reason: HOSPADM

## 2023-12-02 RX ORDER — 0.9 % SODIUM CHLORIDE 0.9 %
1000 INTRAVENOUS SOLUTION INTRAVENOUS ONCE
Status: COMPLETED | OUTPATIENT
Start: 2023-12-02 | End: 2023-12-02

## 2023-12-02 RX ORDER — IPRATROPIUM BROMIDE AND ALBUTEROL SULFATE 2.5; .5 MG/3ML; MG/3ML
1 SOLUTION RESPIRATORY (INHALATION)
Status: DISCONTINUED | OUTPATIENT
Start: 2023-12-02 | End: 2023-12-03 | Stop reason: HOSPADM

## 2023-12-02 RX ORDER — INSULIN LISPRO 100 [IU]/ML
0-4 INJECTION, SOLUTION INTRAVENOUS; SUBCUTANEOUS NIGHTLY
Status: DISCONTINUED | OUTPATIENT
Start: 2023-12-02 | End: 2023-12-03 | Stop reason: HOSPADM

## 2023-12-02 RX ORDER — FOLIC ACID 1 MG/1
1 TABLET ORAL DAILY
Status: DISCONTINUED | OUTPATIENT
Start: 2023-12-02 | End: 2023-12-03 | Stop reason: HOSPADM

## 2023-12-02 RX ORDER — IPRATROPIUM BROMIDE AND ALBUTEROL SULFATE 2.5; .5 MG/3ML; MG/3ML
1 SOLUTION RESPIRATORY (INHALATION) EVERY 4 HOURS PRN
Status: DISCONTINUED | OUTPATIENT
Start: 2023-12-02 | End: 2023-12-03 | Stop reason: HOSPADM

## 2023-12-02 RX ORDER — LANOLIN ALCOHOL/MO/W.PET/CERES
100 CREAM (GRAM) TOPICAL DAILY
Status: DISCONTINUED | OUTPATIENT
Start: 2023-12-02 | End: 2023-12-03 | Stop reason: HOSPADM

## 2023-12-02 RX ADMIN — AMLODIPINE BESYLATE 10 MG: 10 TABLET ORAL at 13:28

## 2023-12-02 RX ADMIN — LABETALOL HYDROCHLORIDE 1 MG/MIN: 5 INJECTION INTRAVENOUS at 15:54

## 2023-12-02 RX ADMIN — IPRATROPIUM BROMIDE AND ALBUTEROL SULFATE 1 DOSE: .5; 3 SOLUTION RESPIRATORY (INHALATION) at 09:13

## 2023-12-02 RX ADMIN — SODIUM CHLORIDE 1000 ML: 9 INJECTION, SOLUTION INTRAVENOUS at 13:25

## 2023-12-02 RX ADMIN — LABETALOL HYDROCHLORIDE 0.5 MG/MIN: 5 INJECTION INTRAVENOUS at 09:39

## 2023-12-02 RX ADMIN — FOLIC ACID 1 MG: 1 TABLET ORAL at 12:55

## 2023-12-02 RX ADMIN — INSULIN LISPRO 1 UNITS: 100 INJECTION, SOLUTION INTRAVENOUS; SUBCUTANEOUS at 12:55

## 2023-12-02 RX ADMIN — AZITHROMYCIN MONOHYDRATE 500 MG: 500 INJECTION, POWDER, LYOPHILIZED, FOR SOLUTION INTRAVENOUS at 22:37

## 2023-12-02 RX ADMIN — OXYCODONE HYDROCHLORIDE AND ACETAMINOPHEN 500 MG: 500 TABLET ORAL at 09:30

## 2023-12-02 RX ADMIN — SODIUM CHLORIDE 1000 ML: 9 INJECTION, SOLUTION INTRAVENOUS at 20:37

## 2023-12-02 RX ADMIN — Medication 50 MG: at 09:30

## 2023-12-02 RX ADMIN — IPRATROPIUM BROMIDE AND ALBUTEROL SULFATE 1 DOSE: .5; 3 SOLUTION RESPIRATORY (INHALATION) at 20:24

## 2023-12-02 RX ADMIN — ENOXAPARIN SODIUM 30 MG: 100 INJECTION SUBCUTANEOUS at 00:22

## 2023-12-02 RX ADMIN — PREDNISONE 40 MG: 20 TABLET ORAL at 09:29

## 2023-12-02 RX ADMIN — IPRATROPIUM BROMIDE AND ALBUTEROL SULFATE 1 DOSE: .5; 3 SOLUTION RESPIRATORY (INHALATION) at 13:56

## 2023-12-02 RX ADMIN — Medication 2000 UNITS: at 09:29

## 2023-12-02 RX ADMIN — IPRATROPIUM BROMIDE AND ALBUTEROL SULFATE 1 DOSE: .5; 3 SOLUTION RESPIRATORY (INHALATION) at 04:14

## 2023-12-02 RX ADMIN — PREDNISONE 40 MG: 20 TABLET ORAL at 00:22

## 2023-12-02 RX ADMIN — ENOXAPARIN SODIUM 30 MG: 100 INJECTION SUBCUTANEOUS at 20:36

## 2023-12-02 RX ADMIN — ENOXAPARIN SODIUM 30 MG: 100 INJECTION SUBCUTANEOUS at 09:28

## 2023-12-02 RX ADMIN — IPRATROPIUM BROMIDE AND ALBUTEROL SULFATE 1 DOSE: .5; 3 SOLUTION RESPIRATORY (INHALATION) at 00:09

## 2023-12-02 RX ADMIN — Medication 100 MG: at 12:55

## 2023-12-02 RX ADMIN — SODIUM CHLORIDE, PRESERVATIVE FREE 10 ML: 5 INJECTION INTRAVENOUS at 20:35

## 2023-12-02 RX ADMIN — INSULIN LISPRO 4 UNITS: 100 INJECTION, SOLUTION INTRAVENOUS; SUBCUTANEOUS at 17:05

## 2023-12-02 ASSESSMENT — PAIN SCALES - GENERAL
PAINLEVEL_OUTOF10: 0
PAINLEVEL_OUTOF10: 0

## 2023-12-02 NOTE — FLOWSHEET NOTE
12/02/23 1129   Safe Environment   Safety Measures Call light within reach; Side rails X 2;Standard Safety Measures  (Virtual Nurse Safety Round)     VN completed safety rounds. No answer from the patient. Camera accessed for safety. Patient visualized resting in bed with eyes closed. Rise and fall of chest noted. Respirations are unlabored. Call bell and personal belongings within reach.

## 2023-12-02 NOTE — ED PROVIDER NOTES
[address] [facility] EMERGENCY DEPT  eMERGENCY dEPARTMENT eNCOUnter   Independent Attestation     Pt Name: Todd Preston  MRN: [id number]  [facility] 1955  Date of evaluation: 151/23    55-year-old old male who was asked to see by the midlevel provider as the patient would like to sign out [facility]. The patient presents hypoxic and tachycardic, rate of 134, sent in from urgent care. Extensive workup here is nondiagnostic. However the patient clearly looks unwell, although he is not clinically unstable. No chest pain. He does desat with any ambulation and at rest when the oxygen is taken off. He is not on home oxygen. No lower extremity pain or swelling. No fevers or chills. Abdomen: Fullness but no pain according to him. Reviewed data so far. Had lengthy conversation with the patient in the presence of nursing as well as the APC regarding the need for the patient to stay    Electronically signed by Arianna Peralta DO on 12/1/23 at 9:25 PM EST  Attending Emergency  Physician    Addendum:    There are multiple discussions and conversations, we were able to get the patient to stay to be admitted to the hospital.  The hospitalist team evaluate the patient at the bedside, wrote admission orders. My reassessment, his heart rate continues to be 135 exactly. Further examination EKG reveals likely atrial flutter. Therefore calcium channel blocker bolus and drip ordered by myself and I communicated this as well as the atrial flutter suspicion with the admitting team.  However they asked that we hold off as they were planning on starting a beta-blocker drip which I do feel is acceptable.      Arianna Peralta DO  12/04/23 2122

## 2023-12-02 NOTE — H&P
murmurs, rubs or gallops. Abdomen: Soft, non-tender, non-distended with normal bowel sounds. Musculoskeletal: passive and active ROM x 4 extremities. Skin: Skin color, texture, turgor normal.  No rashes or lesions. Neurologic:  Neurovascularly intact without any focal sensory/motor deficits.  Cranial nerves: II-XII intact, grossly non-focal.  Psychiatric: Alert and oriented, thought content appropriate, normal insight  Extremities: No peripheral edema noted  Capillary Refill: Brisk,< 3 seconds   Peripheral Pulses: +2 palpable, equal bilaterally      Labs:   Results for orders placed or performed during the hospital encounter of 12/01/23   COVID-19, Rapid   Result Value Ref Range    SARS-CoV-2, ANNA NOT  DETECTED NOT DETECTED   Rapid influenza A/B antigens   Result Value Ref Range    Flu A Antigen Negative NEGATIVE    Flu B Antigen Negative NEGATIVE   MRSA by PCR    Specimen: Nares   Result Value Ref Range    MRSA SCREEN RT-PCR NEGATIVE    CBC with Auto Differential   Result Value Ref Range    WBC 8.3 4.8 - 10.8 thou/mm3    RBC 5.42 4.70 - 6.10 mill/mm3    Hemoglobin 17.1 14.0 - 18.0 gm/dl    Hematocrit 51.1 42.0 - 52.0 %    MCV 94.3 (H) 80.0 - 94.0 fL    MCH 31.5 26.0 - 33.0 pg    MCHC 33.5 32.2 - 35.5 gm/dl    RDW-CV 12.6 11.5 - 14.5 %    RDW-SD 43.5 35.0 - 45.0 fL    Platelets 337 469 - 230 thou/mm3    MPV 11.5 9.4 - 12.4 fL    Seg Neutrophils 72.0 %    Lymphocytes 17.6 %    Monocytes 8.3 %    Eosinophils 1.0 %    Basophils 0.7 %    Immature Granulocytes 0.4 %    Segs Absolute 6.0 1.8 - 7.7 thou/mm3    Lymphocytes Absolute 1.5 1.0 - 4.8 thou/mm3    Monocytes Absolute 0.7 0.4 - 1.3 thou/mm3    Eosinophils Absolute 0.1 0.0 - 0.4 thou/mm3    Basophils Absolute 0.1 0.0 - 0.1 thou/mm3    Immature Grans (Abs) 0.03 0.00 - 0.07 thou/mm3    nRBC 0 /100 wbc   Comprehensive Metabolic Panel   Result Value Ref Range    Glucose 128 (H) 70 - 108 mg/dL    Creatinine 1.1 0.4 - 1.2 mg/dL    BUN 15 7 - 22 mg/dL    Sodium 142 135 -
Home

## 2023-12-02 NOTE — ED NOTES
Assumed care at this time. Received report from [facility]. Pt resting in bed alert. Admitting provider at bedside assessing pt.  Pt stable at this time and updated on IP bed assignment     Rory Bermudez RN  12/01/23 3660

## 2023-12-02 NOTE — ED NOTES
In for hourly rounding. Pt resting on cot in position of comfort. Pt remains A&Ox4, resps easy and unlabored. Pt remains off O2 at this time. IV shows no s/s of infection or infiltration. Pt denies pain at this time. Monitor remains in place. Updated pt on POC. Will monitor.      Leo Martinez RN  12/01/23 1929

## 2023-12-02 NOTE — ED NOTES
Pt and vs reassessed. Pt resting in bed alert. Oxygen sat noted to be at 90%. Pt oxygen increased to 2 L NC and pt tolerating well. Pt stable at this time.  Labetalol rate dose change- see Apollo Velez RN  12/01/23 9840

## 2023-12-02 NOTE — ED NOTES
Pt and vs reassessed. Pt resting in bed alert. Pt medicated per [facility] and denies any other needs.  Pt stable at this time     Darryle Levans, RN  12/01/23 6400

## 2023-12-02 NOTE — ED NOTES
In for hourly rounding. Pt resting on cot in position of comfort. Pt remains A&Ox4, resps easy and unlabored. Pt remains on 1LPM via nc after weaning self by turning O2 down on his own. IV shows no s/s of infection or infiltration. Pt pain remains unchanged at this time. Monitor remains in place. Updated pt on POC and pt in agreement of staying. Will monitor.      Nola Robin RN  12/01/23 2035

## 2023-12-03 VITALS
HEART RATE: 80 BPM | TEMPERATURE: 97.7 F | RESPIRATION RATE: 20 BRPM | OXYGEN SATURATION: 91 % | DIASTOLIC BLOOD PRESSURE: 88 MMHG | BODY MASS INDEX: 29.54 KG/M2 | HEIGHT: 74 IN | WEIGHT: 230.16 LBS | SYSTOLIC BLOOD PRESSURE: 123 MMHG

## 2023-12-03 PROBLEM — R06.89 DYSPNEA AND RESPIRATORY ABNORMALITIES: Status: ACTIVE | Noted: 2023-12-03

## 2023-12-03 PROBLEM — R06.00 DYSPNEA AND RESPIRATORY ABNORMALITIES: Status: ACTIVE | Noted: 2023-12-03

## 2023-12-03 LAB
BACTERIA SPEC AEROBE CULT: NORMAL
DEPRECATED MEAN GLUCOSE BLD GHB EST-ACNC: 159 MG/DL (ref 70–126)
FOLATE SERPL-MCNC: 11.2 NG/ML (ref 4.8–24.2)
GLUCOSE BLD STRIP.AUTO-MCNC: 170 MG/DL (ref 70–108)
GLUCOSE BLD STRIP.AUTO-MCNC: 218 MG/DL (ref 70–108)
HBA1C MFR BLD HPLC: 7.3 % (ref 4.4–6.4)
VIT B12 SERPL-MCNC: 350 PG/ML (ref 211–911)

## 2023-12-03 PROCEDURE — 6370000000 HC RX 637 (ALT 250 FOR IP)

## 2023-12-03 PROCEDURE — 36415 COLL VENOUS BLD VENIPUNCTURE: CPT

## 2023-12-03 PROCEDURE — 2580000003 HC RX 258

## 2023-12-03 PROCEDURE — 94761 N-INVAS EAR/PLS OXIMETRY MLT: CPT

## 2023-12-03 PROCEDURE — 82607 VITAMIN B-12: CPT

## 2023-12-03 PROCEDURE — 99239 HOSP IP/OBS DSCHRG MGMT >30: CPT | Performed by: STUDENT IN AN ORGANIZED HEALTH CARE EDUCATION/TRAINING PROGRAM

## 2023-12-03 PROCEDURE — 2580000003 HC RX 258: Performed by: STUDENT IN AN ORGANIZED HEALTH CARE EDUCATION/TRAINING PROGRAM

## 2023-12-03 PROCEDURE — 83036 HEMOGLOBIN GLYCOSYLATED A1C: CPT

## 2023-12-03 PROCEDURE — 82948 REAGENT STRIP/BLOOD GLUCOSE: CPT

## 2023-12-03 PROCEDURE — 82746 ASSAY OF FOLIC ACID SERUM: CPT

## 2023-12-03 PROCEDURE — 6360000002 HC RX W HCPCS

## 2023-12-03 PROCEDURE — 93005 ELECTROCARDIOGRAM TRACING: CPT

## 2023-12-03 PROCEDURE — 94669 MECHANICAL CHEST WALL OSCILL: CPT

## 2023-12-03 PROCEDURE — 94640 AIRWAY INHALATION TREATMENT: CPT

## 2023-12-03 PROCEDURE — 93010 ELECTROCARDIOGRAM REPORT: CPT | Performed by: INTERNAL MEDICINE

## 2023-12-03 PROCEDURE — 2700000000 HC OXYGEN THERAPY PER DAY

## 2023-12-03 PROCEDURE — 6370000000 HC RX 637 (ALT 250 FOR IP): Performed by: STUDENT IN AN ORGANIZED HEALTH CARE EDUCATION/TRAINING PROGRAM

## 2023-12-03 RX ORDER — ALBUTEROL SULFATE 90 UG/1
2 AEROSOL, METERED RESPIRATORY (INHALATION) EVERY 6 HOURS PRN
Qty: 1 EACH | Refills: 3 | Status: SHIPPED | OUTPATIENT
Start: 2023-12-03

## 2023-12-03 RX ORDER — AMLODIPINE BESYLATE 10 MG/1
10 TABLET ORAL DAILY
Qty: 30 TABLET | Refills: 3 | Status: SHIPPED | OUTPATIENT
Start: 2023-12-03

## 2023-12-03 RX ORDER — PREDNISONE 20 MG/1
40 TABLET ORAL DAILY
Qty: 6 TABLET | Refills: 0 | Status: SHIPPED | OUTPATIENT
Start: 2023-12-04 | End: 2023-12-07

## 2023-12-03 RX ORDER — AMLODIPINE BESYLATE 10 MG/1
10 TABLET ORAL DAILY
Qty: 30 TABLET | Refills: 0 | Status: SHIPPED | OUTPATIENT
Start: 2023-12-03 | End: 2023-12-03 | Stop reason: SDUPTHER

## 2023-12-03 RX ORDER — ALBUTEROL SULFATE 90 UG/1
2 AEROSOL, METERED RESPIRATORY (INHALATION) EVERY 6 HOURS PRN
Qty: 1 EACH | Refills: 0 | Status: SHIPPED | OUTPATIENT
Start: 2023-12-03 | End: 2023-12-03 | Stop reason: SDUPTHER

## 2023-12-03 RX ORDER — AZITHROMYCIN 250 MG/1
500 TABLET, FILM COATED ORAL ONCE
Status: COMPLETED | OUTPATIENT
Start: 2023-12-03 | End: 2023-12-03

## 2023-12-03 RX ORDER — SODIUM CHLORIDE, SODIUM LACTATE, POTASSIUM CHLORIDE, AND CALCIUM CHLORIDE .6; .31; .03; .02 G/100ML; G/100ML; G/100ML; G/100ML
1000 INJECTION, SOLUTION INTRAVENOUS ONCE
Status: COMPLETED | OUTPATIENT
Start: 2023-12-03 | End: 2023-12-03

## 2023-12-03 RX ADMIN — SODIUM CHLORIDE, POTASSIUM CHLORIDE, SODIUM LACTATE AND CALCIUM CHLORIDE 1000 ML: 600; 310; 30; 20 INJECTION, SOLUTION INTRAVENOUS at 08:00

## 2023-12-03 RX ADMIN — SODIUM CHLORIDE, PRESERVATIVE FREE 10 ML: 5 INJECTION INTRAVENOUS at 08:01

## 2023-12-03 RX ADMIN — ENOXAPARIN SODIUM 30 MG: 100 INJECTION SUBCUTANEOUS at 08:03

## 2023-12-03 RX ADMIN — Medication 50 MG: at 08:05

## 2023-12-03 RX ADMIN — INSULIN LISPRO 2 UNITS: 100 INJECTION, SOLUTION INTRAVENOUS; SUBCUTANEOUS at 11:57

## 2023-12-03 RX ADMIN — IPRATROPIUM BROMIDE AND ALBUTEROL SULFATE 1 DOSE: .5; 3 SOLUTION RESPIRATORY (INHALATION) at 13:16

## 2023-12-03 RX ADMIN — OXYCODONE HYDROCHLORIDE AND ACETAMINOPHEN 500 MG: 500 TABLET ORAL at 08:06

## 2023-12-03 RX ADMIN — IPRATROPIUM BROMIDE AND ALBUTEROL SULFATE 1 DOSE: .5; 3 SOLUTION RESPIRATORY (INHALATION) at 09:21

## 2023-12-03 RX ADMIN — Medication 2000 UNITS: at 08:05

## 2023-12-03 RX ADMIN — AMLODIPINE BESYLATE 10 MG: 10 TABLET ORAL at 08:06

## 2023-12-03 RX ADMIN — FOLIC ACID 1 MG: 1 TABLET ORAL at 08:06

## 2023-12-03 RX ADMIN — PREDNISONE 40 MG: 20 TABLET ORAL at 08:06

## 2023-12-03 RX ADMIN — AZITHROMYCIN DIHYDRATE 500 MG: 250 TABLET ORAL at 14:49

## 2023-12-03 RX ADMIN — Medication 100 MG: at 08:05

## 2023-12-03 NOTE — DISCHARGE SUMMARY
the thoracic spine are poorly visualized. 1. Bilateral lower lung atelectasis/infiltrate. 2. Moderate stable cardiomegaly. **This report has been created using voice recognition software. It may contain minor errors which are inherent in voice recognition technology. ** Final report electronically signed by Dr. Len Danielle on 12/1/2023 3:10 PM       Follow-up scheduled after discharge :-    in 5-7 days with Fredi Pfeiffer MD      Consultations during this hospital stay:-  [] NONE [] Cardiology  [] Nephrology  [] Hemo onco   [] GI   [] ID  [] Endocrine  [] Pulm    [] Neuro    [] Psych   [] Urology  [] ENT   [] G SURGERY   []Ortho    []CV surg    [] Palliative  [] Hospice [] Pain management   []    []TCU   [] PT/OT  OTHERS:-    Disposition: home  Condition at Discharge: Stable    Time Spent:- 40 minutes    Electronically signed by Lamine Moore DO on 12/3/23 at 1:18 PM EST   Discharging Hospitalist

## 2023-12-03 NOTE — PLAN OF CARE
Problem: Discharge Planning  Goal: Discharge to home or other facility with appropriate resources  Outcome: Progressing  Flowsheets (Taken 12/2/2023 1735)  Discharge to home or other facility with appropriate resources:   Identify barriers to discharge with patient and caregiver   Identify discharge learning needs (meds, wound care, etc)   Refer to discharge planning if patient needs post-hospital services based on physician order or complex needs related to functional status, cognitive ability or social support system   Arrange for needed discharge resources and transportation as appropriate     Problem: Cardiovascular - Adult  Goal: Maintains optimal cardiac output and hemodynamic stability  Outcome: Progressing  Flowsheets (Taken 12/2/2023 1735)  Maintains optimal cardiac output and hemodynamic stability:   Monitor blood pressure and heart rate   Monitor urine output and notify Licensed Independent Practitioner for values outside of normal range   Assess for signs of decreased cardiac output     Problem: Cardiovascular - Adult  Goal: Absence of cardiac dysrhythmias or at baseline  Outcome: Progressing  Flowsheets (Taken 12/2/2023 1735)  Absence of cardiac dysrhythmias or at baseline:   Monitor cardiac rate and rhythm   Assess for signs of decreased cardiac output   Administer antiarrhythmia medication and electrolyte replacement as ordered     Problem: Metabolic/Fluid and Electrolytes - Adult  Goal: Electrolytes maintained within normal limits  Outcome: Progressing  Flowsheets (Taken 12/2/2023 1735)  Electrolytes maintained within normal limits:   Monitor labs and assess patient for signs and symptoms of electrolyte imbalances   Administer electrolyte replacement as ordered   Monitor response to electrolyte replacements, including repeat lab results as appropriate     Problem: Metabolic/Fluid and Electrolytes - Adult  Goal: Hemodynamic stability and optimal renal function maintained  Outcome:
Problem: Respiratory - Adult  Goal: Clear lung sounds  12/2/2023 1710 by Eva Wooten RCP  Outcome: Progressing  12/2/2023 0923 by Perla Appiah RCP  Outcome: Progressing
Problem: Respiratory - Adult  Goal: Clear lung sounds  Outcome: Progressing   Pt continues on aerosols to clear lung sounds. Patient mutually agreed on goals.
2149 by Ora Carter RN  Outcome: Progressing  Flowsheets (Taken 12/2/2023 2030)  Maintains hematologic stability:   Assess for signs and symptoms of bleeding or hemorrhage   Monitor labs for bleeding or clotting disorders   Administer blood products/factors as ordered  12/2/2023 1735 by Elba Horta RN  Outcome: Progressing  Flowsheets (Taken 12/2/2023 1735)  Maintains hematologic stability: Assess for signs and symptoms of bleeding or hemorrhage     Problem: Chronic Conditions and Co-morbidities  Goal: Patient's chronic conditions and co-morbidity symptoms are monitored and maintained or improved  12/2/2023 2149 by Ora Carter RN  Outcome: Progressing  Flowsheets (Taken 12/2/2023 2030)  Care Plan - Patient's Chronic Conditions and Co-Morbidity Symptoms are Monitored and Maintained or Improved:   Monitor and assess patient's chronic conditions and comorbid symptoms for stability, deterioration, or improvement   Collaborate with multidisciplinary team to address chronic and comorbid conditions and prevent exacerbation or deterioration   Update acute care plan with appropriate goals if chronic or comorbid symptoms are exacerbated and prevent overall improvement and discharge  12/2/2023 1735 by Elba Horta RN  Outcome: Progressing  Flowsheets (Taken 12/2/2023 1735)  Care Plan - Patient's Chronic Conditions and Co-Morbidity Symptoms are Monitored and Maintained or Improved:   Monitor and assess patient's chronic conditions and comorbid symptoms for stability, deterioration, or improvement   Collaborate with multidisciplinary team to address chronic and comorbid conditions and prevent exacerbation or deterioration   Update acute care plan with appropriate goals if chronic or comorbid symptoms are exacerbated and prevent overall improvement and discharge     Problem: Gastrointestinal - Adult  Goal: Maintains adequate nutritional intake  12/2/2023 2149 by Ora Carter RN  Outcome:

## 2023-12-03 NOTE — FLOWSHEET NOTE
12/03/23 1230   Safe Environment   Safety Measures Other (comment)  (Virtual Nurse Safety Round)     VN completed safety rounds. Patient alert and oriented x3. Patient responded to voice stating \"I'm about to take a shower. .. I'm fine\". Patient care tech at bedside. Education given on call light use. No camera access granted at this time.

## 2023-12-06 LAB
BACTERIA BLD AEROBE CULT: NORMAL
BACTERIA BLD AEROBE CULT: NORMAL
EKG ATRIAL RATE: 99 BPM
EKG P AXIS: -82 DEGREES
EKG P-R INTERVAL: 238 MS
EKG Q-T INTERVAL: 416 MS
EKG QRS DURATION: 148 MS
EKG QTC CALCULATION (BAZETT): 533 MS
EKG R AXIS: -46 DEGREES
EKG T AXIS: 107 DEGREES
EKG VENTRICULAR RATE: 99 BPM

## 2024-02-07 NOTE — PROGRESS NOTES
[facility]    Patient: GILMER DALY, 69 y.o.   : 1955  2024    Patient of Yash Haeny MD   Referring Provider: John Dash DO       Subjective     Chief Complaint   Patient presents with    New Patient     New patient, Referred by Dr. Dash for COPD.   CTA 2023  CXR 2023        HPI  Gilmer with presents to Pulmonology clinic today to discuss his respiratory symptoms.  Patient was recently admitted for likely COPD exacerbation and hypertensive emergency.  Prior to this he endorsed shortness of breath and difficulty with inhalation for 3 days prior to hospitalization.  He also endorsed severe coughing spells leading to dizziness which has improved following discharge. He was discharged home with Symbicort and albuterol which he has been using once a day and reports feeling better.     He reports having clear sputum production in the morning but denies change in color, volume.  No hemoptysis.  He endorses shortness of breath with exertion but has been walking on a treadmill for 15-minute intervals a couple times a day which she has been tolerating well.    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      He has history of smoking crack cocaine and currently smokes about 1 pack every 3 days (prior to his hospitalization was smoking 1 pack a day for at least 30 years).  He denies vaping or marijuana use.      Immunizations:  Immunization History   Administered Date(s) Administered    COVID-19, MODERNA BLUE border, Primary or Immunocompromised, (age 12y+), IM, 100 mcg/0.5mL 2021, 2021    Pneumococcal, PCV20, PREVNAR 20, (age 6w+), IM, 0.5mL 2024      Past Medical hx   PMH:  Past Medical History:   Diagnosis Date    Diabetes mellitus (HCC)     Hypertension      SURGICAL HISTORY:  Past Surgical

## 2024-02-08 ENCOUNTER — OFFICE VISIT (OUTPATIENT)
Dept: PULMONOLOGY | Age: 69
End: 2024-02-08
Payer: MEDICARE

## 2024-02-08 VITALS
SYSTOLIC BLOOD PRESSURE: 134 MMHG | HEART RATE: 124 BPM | WEIGHT: 230 LBS | HEIGHT: 74 IN | TEMPERATURE: 97.6 F | DIASTOLIC BLOOD PRESSURE: 80 MMHG | BODY MASS INDEX: 29.52 KG/M2 | OXYGEN SATURATION: 97 %

## 2024-02-08 DIAGNOSIS — J44.1 CHRONIC OBSTRUCTIVE PULMONARY DISEASE WITH ACUTE EXACERBATION (HCC): Primary | ICD-10-CM

## 2024-02-08 DIAGNOSIS — Z23 ENCOUNTER FOR VACCINATION: ICD-10-CM

## 2024-02-08 DIAGNOSIS — R06.02 SOB (SHORTNESS OF BREATH): ICD-10-CM

## 2024-02-08 DIAGNOSIS — Z87.891 PERSONAL HISTORY OF TOBACCO USE: ICD-10-CM

## 2024-02-08 PROCEDURE — G8484 FLU IMMUNIZE NO ADMIN: HCPCS | Performed by: INTERNAL MEDICINE

## 2024-02-08 PROCEDURE — 3079F DIAST BP 80-89 MM HG: CPT | Performed by: INTERNAL MEDICINE

## 2024-02-08 PROCEDURE — 3075F SYST BP GE 130 - 139MM HG: CPT | Performed by: INTERNAL MEDICINE

## 2024-02-08 PROCEDURE — 3017F COLORECTAL CA SCREEN DOC REV: CPT | Performed by: INTERNAL MEDICINE

## 2024-02-08 PROCEDURE — 3023F SPIROM DOC REV: CPT | Performed by: INTERNAL MEDICINE

## 2024-02-08 PROCEDURE — G8419 CALC BMI OUT NRM PARAM NOF/U: HCPCS | Performed by: INTERNAL MEDICINE

## 2024-02-08 PROCEDURE — 1123F ACP DISCUSS/DSCN MKR DOCD: CPT | Performed by: INTERNAL MEDICINE

## 2024-02-08 PROCEDURE — 99205 OFFICE O/P NEW HI 60 MIN: CPT | Performed by: INTERNAL MEDICINE

## 2024-02-08 PROCEDURE — G8427 DOCREV CUR MEDS BY ELIG CLIN: HCPCS | Performed by: INTERNAL MEDICINE

## 2024-02-08 PROCEDURE — 1036F TOBACCO NON-USER: CPT | Performed by: INTERNAL MEDICINE

## 2024-03-18 ENCOUNTER — HOSPITAL ENCOUNTER (OUTPATIENT)
Dept: CT IMAGING | Age: 69
Discharge: HOME | End: 2024-03-18
Attending: INTERNAL MEDICINE
Payer: MEDICARE

## 2024-03-18 ENCOUNTER — HOSPITAL ENCOUNTER (OUTPATIENT)
Age: 69
Discharge: HOME | End: 2024-03-20
Attending: INTERNAL MEDICINE
Payer: MEDICARE

## 2024-03-18 DIAGNOSIS — Z87.891 PERSONAL HISTORY OF TOBACCO USE: ICD-10-CM

## 2024-03-18 DIAGNOSIS — R06.02 SOB (SHORTNESS OF BREATH): ICD-10-CM

## 2024-03-18 LAB
ECHO AV CUSP MM: 2.5 CM
ECHO AV PEAK GRADIENT: 2 MMHG
ECHO AV PEAK VELOCITY: 0.7 M/S
ECHO AV VELOCITY RATIO: 0.86
ECHO EST RA PRESSURE: 10 MMHG
ECHO LA AREA 2C: 15.7 CM2
ECHO LA DIAMETER: 4.1 CM
ECHO LA MINOR AXIS: 5.6 CM
ECHO LA VOL MOD A2C: 35 ML (ref 18–58)
ECHO LV EDV A2C: 92 ML
ECHO LV EDV A4C: 127 ML
ECHO LV EJECTION FRACTION A2C: 27 %
ECHO LV EJECTION FRACTION A4C: 32 %
ECHO LV EJECTION FRACTION BIPLANE: 28 % (ref 55–100)
ECHO LV ESV A2C: 67 ML
ECHO LV ESV A4C: 87 ML
ECHO LV FRACTIONAL SHORTENING: 20 % (ref 28–44)
ECHO LV INTERNAL DIMENSION DIASTOLIC: 4.5 CM (ref 4.2–5.9)
ECHO LV INTERNAL DIMENSION SYSTOLIC: 3.6 CM
ECHO LV IVSD: 1.7 CM (ref 0.6–1)
ECHO LV MASS 2D: 319.6 G (ref 88–224)
ECHO LV POSTERIOR WALL DIASTOLIC: 1.6 CM (ref 0.6–1)
ECHO LV RELATIVE WALL THICKNESS RATIO: 0.71
ECHO LVOT PEAK GRADIENT: 1 MMHG
ECHO LVOT PEAK VELOCITY: 0.6 M/S
ECHO MV A VELOCITY: 0.64 M/S
ECHO MV E VELOCITY: 0.23 M/S
ECHO MV E/A RATIO: 0.36
ECHO MV REGURGITANT PEAK GRADIENT: 34 MMHG
ECHO MV REGURGITANT PEAK VELOCITY: 2.9 M/S
ECHO PV MAX VELOCITY: 0.4 M/S
ECHO PV PEAK GRADIENT: 1 MMHG
ECHO RIGHT VENTRICULAR SYSTOLIC PRESSURE (RVSP): 35 MMHG
ECHO RV GLOBAL SYSTOLIC STRAIN (GLS): -3.6 %
ECHO RV INTERNAL DIMENSION: 3.7 CM
ECHO TV REGURGITANT MAX VELOCITY: 2.52 M/S
ECHO TV REGURGITANT PEAK GRADIENT: 25 MMHG

## 2024-03-18 PROCEDURE — 93306 TTE W/DOPPLER COMPLETE: CPT

## 2024-03-18 PROCEDURE — 93306 TTE W/DOPPLER COMPLETE: CPT | Performed by: NUCLEAR MEDICINE

## 2024-03-18 PROCEDURE — 71271 CT THORAX LUNG CANCER SCR C-: CPT

## 2024-04-26 ENCOUNTER — OFFICE VISIT (OUTPATIENT)
Dept: PULMONOLOGY | Age: 69
End: 2024-04-26
Payer: MEDICARE

## 2024-04-26 VITALS
SYSTOLIC BLOOD PRESSURE: 128 MMHG | HEART RATE: 120 BPM | OXYGEN SATURATION: 99 % | DIASTOLIC BLOOD PRESSURE: 70 MMHG | TEMPERATURE: 97.9 F | BODY MASS INDEX: 30.36 KG/M2 | HEIGHT: 74 IN | WEIGHT: 236.6 LBS

## 2024-04-26 DIAGNOSIS — J43.2 CENTRILOBULAR EMPHYSEMA (HCC): ICD-10-CM

## 2024-04-26 DIAGNOSIS — F17.210 CIGARETTE SMOKER: ICD-10-CM

## 2024-04-26 DIAGNOSIS — R06.02 SOB (SHORTNESS OF BREATH): Primary | ICD-10-CM

## 2024-04-26 DIAGNOSIS — Z71.6 ENCOUNTER FOR SMOKING CESSATION COUNSELING: ICD-10-CM

## 2024-04-26 PROCEDURE — G8417 CALC BMI ABV UP PARAM F/U: HCPCS | Performed by: INTERNAL MEDICINE

## 2024-04-26 PROCEDURE — 3017F COLORECTAL CA SCREEN DOC REV: CPT | Performed by: INTERNAL MEDICINE

## 2024-04-26 PROCEDURE — 99214 OFFICE O/P EST MOD 30 MIN: CPT | Performed by: INTERNAL MEDICINE

## 2024-04-26 PROCEDURE — 4004F PT TOBACCO SCREEN RCVD TLK: CPT | Performed by: INTERNAL MEDICINE

## 2024-04-26 PROCEDURE — 3074F SYST BP LT 130 MM HG: CPT | Performed by: INTERNAL MEDICINE

## 2024-04-26 PROCEDURE — 3023F SPIROM DOC REV: CPT | Performed by: INTERNAL MEDICINE

## 2024-04-26 PROCEDURE — 1123F ACP DISCUSS/DSCN MKR DOCD: CPT | Performed by: INTERNAL MEDICINE

## 2024-04-26 PROCEDURE — 3078F DIAST BP <80 MM HG: CPT | Performed by: INTERNAL MEDICINE

## 2024-04-26 PROCEDURE — G8427 DOCREV CUR MEDS BY ELIG CLIN: HCPCS | Performed by: INTERNAL MEDICINE

## 2024-04-26 RX ORDER — BUDESONIDE AND FORMOTEROL FUMARATE DIHYDRATE 80; 4.5 UG/1; UG/1
2 AEROSOL RESPIRATORY (INHALATION) 2 TIMES DAILY
COMMUNITY

## 2024-04-26 RX ORDER — BUDESONIDE, GLYCOPYRROLATE, AND FORMOTEROL FUMARATE 160; 9; 4.8 UG/1; UG/1; UG/1
2 AEROSOL, METERED RESPIRATORY (INHALATION) 2 TIMES DAILY
Qty: 10.7 G | Refills: 11 | Status: SHIPPED | OUTPATIENT
Start: 2024-04-26

## 2024-04-26 RX ORDER — VARENICLINE TARTRATE 0.5 (11)-1
KIT ORAL
Qty: 53 EACH | Refills: 0 | Status: SHIPPED | OUTPATIENT
Start: 2024-04-26 | End: 2024-06-01